# Patient Record
Sex: FEMALE | Race: WHITE | NOT HISPANIC OR LATINO
[De-identification: names, ages, dates, MRNs, and addresses within clinical notes are randomized per-mention and may not be internally consistent; named-entity substitution may affect disease eponyms.]

---

## 2021-04-23 PROBLEM — Z00.00 ENCOUNTER FOR PREVENTIVE HEALTH EXAMINATION: Status: ACTIVE | Noted: 2021-04-23

## 2021-04-29 ENCOUNTER — APPOINTMENT (OUTPATIENT)
Dept: NEUROLOGY | Facility: CLINIC | Age: 36
End: 2021-04-29

## 2021-07-20 ENCOUNTER — APPOINTMENT (OUTPATIENT)
Dept: NEUROLOGY | Facility: CLINIC | Age: 36
End: 2021-07-20
Payer: MEDICAID

## 2021-07-20 VITALS
BODY MASS INDEX: 37.41 KG/M2 | OXYGEN SATURATION: 97 % | DIASTOLIC BLOOD PRESSURE: 83 MMHG | SYSTOLIC BLOOD PRESSURE: 126 MMHG | WEIGHT: 230 LBS | HEART RATE: 87 BPM | HEIGHT: 65.75 IN | TEMPERATURE: 97.7 F

## 2021-07-20 PROCEDURE — 99205 OFFICE O/P NEW HI 60 MIN: CPT

## 2021-07-20 NOTE — HISTORY OF PRESENT ILLNESS
[FreeTextEntry1] : 37 yo F presents for epilepsy eval\par \par Patient reports she was diagnosed with cryptogenic focal epilepsy.\par 3 convulsive seizures in lifetime- 2003, 2004, 2005.\par Preceded by stressful situations\par Description- confused for 15 minutes (does not recall this period of time) before convulsive seizure w/ urinary incontinence \par She reports she took small dose of Finlepsin (CBZ in [place]) from 2003 to 2009. Neurologist in 2009 d/franco meds at patients request\par Since then no convulsive seizures reported.\par 2 times transient LOC- last time in  2012\par [place] (so [place]),  once occurred at home after drinking wine\par \par Following now with psychologist for anxiety/depression who advised for "fresh EEG."\par Now on Prozac 40mg/daily (for past 3 month- helping w/ depression), Ativan 1.5mg/day (just this week for sleep)\par Also reports severe migraines, sleep disturbances\par \par Previously had anxiety/depression at the time of seizures. Did not want to go out fear of having seizure\par \par No FHx of epilepsy, denies head trauma, meningitis \par \par Son autistic spectrum (does not sleep at night)\par +family stress\par Not driving

## 2021-07-20 NOTE — DISCUSSION/SUMMARY
[FreeTextEntry1] : 35 yo F presents for epilepsy eval\par \par plan:\par 48HR AEEG\par MRI brain epilepsy protocol\par Not driving\par F/u in 1-2 months

## 2021-07-20 NOTE — PHYSICAL EXAM
[General Appearance - Alert] : alert [General Appearance - In No Acute Distress] : in no acute distress [Oriented To Time, Place, And Person] : oriented to person, place, and time [Impaired Insight] : insight and judgment were intact [Person] : oriented to person [Place] : oriented to place [Time] : oriented to time [Fluency] : fluency intact [Cranial Nerves Optic (II)] : visual acuity intact bilaterally,  visual fields full to confrontation, pupils equal round and reactive to light [Cranial Nerves Oculomotor (III)] : extraocular motion intact [Cranial Nerves Trigeminal (V)] : facial sensation intact symmetrically [Cranial Nerves Facial (VII)] : face symmetrical [Cranial Nerves Vestibulocochlear (VIII)] : hearing was intact bilaterally [Cranial Nerves Glossopharyngeal (IX)] : tongue and palate midline [Cranial Nerves Accessory (XI - Cranial And Spinal)] : head turning and shoulder shrug symmetric [Cranial Nerves Hypoglossal (XII)] : there was no tongue deviation with protrusion [Motor Tone] : muscle tone was normal in all four extremities [Motor Strength] : muscle strength was normal in all four extremities [Sensation Tactile Decrease] : light touch was intact [Abnormal Walk] : normal gait [Balance] : balance was intact

## 2021-08-19 ENCOUNTER — OUTPATIENT (OUTPATIENT)
Dept: OUTPATIENT SERVICES | Facility: HOSPITAL | Age: 36
LOS: 1 days | End: 2021-08-19
Payer: MEDICAID

## 2021-08-19 ENCOUNTER — APPOINTMENT (OUTPATIENT)
Dept: MRI IMAGING | Facility: HOSPITAL | Age: 36
End: 2021-08-19
Payer: MEDICAID

## 2021-08-19 PROCEDURE — 70551 MRI BRAIN STEM W/O DYE: CPT

## 2021-08-19 PROCEDURE — 70551 MRI BRAIN STEM W/O DYE: CPT | Mod: 26

## 2021-08-19 PROCEDURE — 76377 3D RENDER W/INTRP POSTPROCES: CPT | Mod: 26

## 2021-08-19 PROCEDURE — 76377 3D RENDER W/INTRP POSTPROCES: CPT

## 2021-09-10 ENCOUNTER — APPOINTMENT (OUTPATIENT)
Dept: NEUROLOGY | Facility: CLINIC | Age: 36
End: 2021-09-10
Payer: MEDICAID

## 2021-09-10 VITALS
HEART RATE: 89 BPM | DIASTOLIC BLOOD PRESSURE: 78 MMHG | HEIGHT: 65.75 IN | OXYGEN SATURATION: 97 % | SYSTOLIC BLOOD PRESSURE: 112 MMHG | BODY MASS INDEX: 37.41 KG/M2 | WEIGHT: 230 LBS | TEMPERATURE: 97.3 F

## 2021-09-10 DIAGNOSIS — M54.2 CERVICALGIA: ICD-10-CM

## 2021-09-10 DIAGNOSIS — M54.9 DORSALGIA, UNSPECIFIED: ICD-10-CM

## 2021-09-10 PROCEDURE — 99215 OFFICE O/P EST HI 40 MIN: CPT

## 2021-09-15 ENCOUNTER — NON-APPOINTMENT (OUTPATIENT)
Age: 36
End: 2021-09-15

## 2021-09-15 ENCOUNTER — APPOINTMENT (OUTPATIENT)
Dept: NEUROLOGY | Facility: CLINIC | Age: 36
End: 2021-09-15
Payer: MEDICAID

## 2021-09-15 ENCOUNTER — APPOINTMENT (OUTPATIENT)
Dept: NEUROLOGY | Facility: CLINIC | Age: 36
End: 2021-09-15

## 2021-09-15 PROCEDURE — 95816 EEG AWAKE AND DROWSY: CPT

## 2021-09-16 ENCOUNTER — NON-APPOINTMENT (OUTPATIENT)
Age: 36
End: 2021-09-16

## 2021-09-17 ENCOUNTER — APPOINTMENT (OUTPATIENT)
Dept: NEUROLOGY | Facility: CLINIC | Age: 36
End: 2021-09-17

## 2021-11-02 ENCOUNTER — APPOINTMENT (OUTPATIENT)
Dept: NEUROLOGY | Facility: CLINIC | Age: 36
End: 2021-11-02
Payer: MEDICAID

## 2021-11-02 VITALS
BODY MASS INDEX: 36.27 KG/M2 | DIASTOLIC BLOOD PRESSURE: 84 MMHG | WEIGHT: 223 LBS | HEART RATE: 91 BPM | HEIGHT: 65.75 IN | SYSTOLIC BLOOD PRESSURE: 118 MMHG | OXYGEN SATURATION: 97 % | TEMPERATURE: 97.1 F

## 2021-11-02 PROCEDURE — 99214 OFFICE O/P EST MOD 30 MIN: CPT

## 2021-11-02 NOTE — HISTORY OF PRESENT ILLNESS
[FreeTextEntry1] : Interim Hx: 11/2/2021\par \par Patient reports that after starting TPM  initially was experiencing loss of appetite and headaches. 3 weeks later headaches went away and appetite came back.\par Based on chart review has lost 7Ibs since last visit. Patient believes she has regained some weight since appetite has returned.\par In the last week had a headache that lasted 5 days continuously and did not respond to Naprosyn.\par \par No seizures.\par Routine EEG normal\par EMU admission not scheduled yet\par ___________________\par Interim Hx: 9/10/2021\par \par Patient report increasing headaches, has been having daily headaches.\par States over past 5 years headache frequency has been increasing. Has been taking ibuprofen often\par Also reports she pulled neck/back lifting an item and has been having pain\par \par No seizures reported.\par EEG not completed yet, would like to do inpatient because her soon will pull of the leads\par \par MRI brain 8/19/2021- white matter changes \par _________________\par Initial Hx: 7/20/2021\par 35 yo F presents for epilepsy eval\par \par Patient reports she was diagnosed with cryptogenic focal epilepsy.\par 3 convulsive seizures in lifetime- 2003, 2004, 2005.\par Preceded by stressful situations\par Description- confused for 15 minutes (does not recall this period of time) before convulsive seizure w/ urinary incontinence \par She reports she took small dose of Finlepsin (CBZ in [place]) from 2003 to 2009. Neurologist in 2009 d/franco meds at patients request\par Since then no convulsive seizures reported.\par 2 times transient LOC- last time in  2012\par [place] subway (so [place]),  once occurred at home after drinking wine\par \par Following now with psychologist for anxiety/depression who advised for "fresh EEG."\par Now on Prozac 40mg/daily (for past 3 month- helping w/ depression), Ativan 1.5mg/day (just this week for sleep)\par Also reports severe migraines, sleep disturbances\par \par Previously had anxiety/depression at the time of seizures. Did not want to go out fear of having seizure\par \par No FHx of epilepsy, denies head trauma, meningitis \par \par Son autistic spectrum (does not sleep at night)\par +family stress\par Not driving

## 2021-11-02 NOTE — DISCUSSION/SUMMARY
Order pended for Dr Mathur to sign for WBC, C&S, O&P, C. Diff. Then will send to [facility].     Annette notified and they are bringing the stool sample up to [facility].        [FreeTextEntry1] : 37 yo F w/ migraines, depression presents for epilepsy eval\par \par plan:\par #focal epilepsy\par 48HR EMU admission (does not want to do Ambulatory study)\par \par \par #migraine/headache\par Increase TPM to 75mg/daily - monitor for more weight loss\par Naprosyn 500mg BID PRN- advised to take at onset of headache\par \par Physical therapy for neck/back pain previously recommended \par \par F/u in 3 months

## 2021-11-03 LAB
ALBUMIN SERPL ELPH-MCNC: 4.6 G/DL
ALP BLD-CCNC: 91 U/L
ALT SERPL-CCNC: 17 U/L
ANION GAP SERPL CALC-SCNC: 15 MMOL/L
AST SERPL-CCNC: 16 U/L
BASOPHILS # BLD AUTO: 0.05 K/UL
BASOPHILS NFR BLD AUTO: 0.6 %
BILIRUB SERPL-MCNC: 0.3 MG/DL
BUN SERPL-MCNC: 16 MG/DL
CALCIUM SERPL-MCNC: 9 MG/DL
CHLORIDE SERPL-SCNC: 105 MMOL/L
CO2 SERPL-SCNC: 18 MMOL/L
CREAT SERPL-MCNC: 0.57 MG/DL
EOSINOPHIL # BLD AUTO: 0.14 K/UL
EOSINOPHIL NFR BLD AUTO: 1.8 %
GLUCOSE SERPL-MCNC: 88 MG/DL
HCT VFR BLD CALC: 40.1 %
HGB BLD-MCNC: 12.6 G/DL
IMM GRANULOCYTES NFR BLD AUTO: 0.1 %
LYMPHOCYTES # BLD AUTO: 2.61 K/UL
LYMPHOCYTES NFR BLD AUTO: 33.6 %
MAN DIFF?: NORMAL
MCHC RBC-ENTMCNC: 28.3 PG
MCHC RBC-ENTMCNC: 31.4 GM/DL
MCV RBC AUTO: 90.1 FL
MONOCYTES # BLD AUTO: 0.34 K/UL
MONOCYTES NFR BLD AUTO: 4.4 %
NEUTROPHILS # BLD AUTO: 4.62 K/UL
NEUTROPHILS NFR BLD AUTO: 59.5 %
PLATELET # BLD AUTO: 428 K/UL
POTASSIUM SERPL-SCNC: 4.4 MMOL/L
PROT SERPL-MCNC: 7.2 G/DL
RBC # BLD: 4.45 M/UL
RBC # FLD: 14.1 %
SODIUM SERPL-SCNC: 137 MMOL/L
WBC # FLD AUTO: 7.77 K/UL

## 2021-11-24 LAB — TOPIRAMATE SERPL-MCNC: 1.4 MCG/ML

## 2022-02-01 ENCOUNTER — APPOINTMENT (OUTPATIENT)
Dept: NEUROLOGY | Facility: CLINIC | Age: 37
End: 2022-02-01
Payer: MEDICAID

## 2022-02-01 VITALS
WEIGHT: 228 LBS | DIASTOLIC BLOOD PRESSURE: 78 MMHG | TEMPERATURE: 97.2 F | HEIGHT: 65.75 IN | SYSTOLIC BLOOD PRESSURE: 113 MMHG | HEART RATE: 91 BPM | OXYGEN SATURATION: 99 % | BODY MASS INDEX: 37.08 KG/M2

## 2022-02-01 PROCEDURE — 99214 OFFICE O/P EST MOD 30 MIN: CPT

## 2022-02-23 ENCOUNTER — RX RENEWAL (OUTPATIENT)
Age: 37
End: 2022-02-23

## 2022-03-22 ENCOUNTER — APPOINTMENT (OUTPATIENT)
Dept: NEUROLOGY | Facility: CLINIC | Age: 37
End: 2022-03-22
Payer: MEDICAID

## 2022-03-23 PROCEDURE — 95719 EEG PHYS/QHP EA INCR W/O VID: CPT

## 2022-03-23 PROCEDURE — 95708 EEG WO VID EA 12-26HR UNMNTR: CPT

## 2022-03-23 PROCEDURE — 95700 EEG CONT REC W/VID EEG TECH: CPT

## 2022-03-24 ENCOUNTER — APPOINTMENT (OUTPATIENT)
Dept: NEUROLOGY | Facility: CLINIC | Age: 37
End: 2022-03-24

## 2022-06-01 ENCOUNTER — APPOINTMENT (OUTPATIENT)
Dept: NEUROLOGY | Facility: CLINIC | Age: 37
End: 2022-06-01
Payer: MEDICAID

## 2022-06-01 VITALS
HEIGHT: 65 IN | SYSTOLIC BLOOD PRESSURE: 117 MMHG | TEMPERATURE: 96.2 F | DIASTOLIC BLOOD PRESSURE: 73 MMHG | OXYGEN SATURATION: 97 % | HEART RATE: 93 BPM

## 2022-06-01 PROCEDURE — 99214 OFFICE O/P EST MOD 30 MIN: CPT

## 2022-06-01 RX ORDER — TOPIRAMATE 25 MG/1
25 TABLET, FILM COATED ORAL
Qty: 90 | Refills: 3 | Status: DISCONTINUED | COMMUNITY
Start: 2022-02-23 | End: 2022-06-01

## 2022-06-01 RX ORDER — FLUOXETINE HYDROCHLORIDE 40 MG/1
40 CAPSULE ORAL
Refills: 0 | Status: DISCONTINUED | COMMUNITY
End: 2022-06-01

## 2022-06-01 RX ORDER — ERGOCALCIFEROL (VITAMIN D2) 1250 MCG
50000 CAPSULE ORAL
Refills: 0 | Status: DISCONTINUED | COMMUNITY
End: 2022-06-01

## 2022-06-01 RX ORDER — LORAZEPAM 1 MG/1
1 TABLET ORAL
Qty: 5 | Refills: 0 | Status: DISCONTINUED | COMMUNITY
End: 2022-06-01

## 2022-10-03 ENCOUNTER — APPOINTMENT (OUTPATIENT)
Dept: NEUROLOGY | Facility: CLINIC | Age: 37
End: 2022-10-03

## 2022-10-03 VITALS
SYSTOLIC BLOOD PRESSURE: 108 MMHG | BODY MASS INDEX: 38.93 KG/M2 | HEIGHT: 65 IN | DIASTOLIC BLOOD PRESSURE: 75 MMHG | HEART RATE: 81 BPM | OXYGEN SATURATION: 96 % | WEIGHT: 233.69 LBS | TEMPERATURE: 96.8 F

## 2022-10-03 PROCEDURE — 99213 OFFICE O/P EST LOW 20 MIN: CPT

## 2023-01-04 ENCOUNTER — RX RENEWAL (OUTPATIENT)
Age: 38
End: 2023-01-04

## 2023-03-17 ENCOUNTER — RX RENEWAL (OUTPATIENT)
Age: 38
End: 2023-03-17

## 2023-04-11 ENCOUNTER — RX RENEWAL (OUTPATIENT)
Age: 38
End: 2023-04-11

## 2023-04-18 ENCOUNTER — RX RENEWAL (OUTPATIENT)
Age: 38
End: 2023-04-18

## 2023-05-19 ENCOUNTER — NON-APPOINTMENT (OUTPATIENT)
Age: 38
End: 2023-05-19

## 2023-05-25 ENCOUNTER — NON-APPOINTMENT (OUTPATIENT)
Age: 38
End: 2023-05-25

## 2023-05-25 ENCOUNTER — APPOINTMENT (OUTPATIENT)
Dept: NEUROLOGY | Facility: CLINIC | Age: 38
End: 2023-05-25
Payer: MEDICAID

## 2023-05-25 PROCEDURE — 99213 OFFICE O/P EST LOW 20 MIN: CPT | Mod: 95

## 2023-05-26 NOTE — DATA REVIEWED
[de-identified] : EXAM:  MR BRAIN SEIZURE EPILEPSY 3D#\par \par PROCEDURE DATE:  08/19/2021\par \par \par \par INTERPRETATION:  PROCEDURE: MRI Brain without contrast\par \par INDICATION: [Focal epilepsy]\par \par TECHNIQUE: Sagittal T1, axial T2, FLAIR, diffusion, gradient echo, and coronal T2, FLAIR hippocampal images of the brain were obtained.\par \par COMPARISON: None\par \par FINDINGS:\par \par The ventricles and sulci are normal in size and configuration.\par \par There is no mass, mass effect, midline shift or extra-axial collection. There is no acute intracranial hemorrhage.\par \par There are few scattered punctate foci of signal hyperintensity within the subcortical and periventricular white matter on FLAIR sequence that are nonspecific in this age demographic and may reflect sequela of minimal chronic microvascular ischemia versus gliosis of other etiologies. There is otherwise no abnormal signal identified within the brain parenchyma. The diffusion-weighted images demonstrate no recent infarction.\par \par \par \par Hippocampi are symmetric in signal and morphology without asymmetric atrophy of the fornices and mamillary bodies identified to suggest mesial temporal sclerosis.\par \par \par There is no susceptibility related signal loss to suggest hemosiderin deposition or calcification.\par \par The vascular flow voids are present.\par \par The sella and suprasellar regions are unremarkable.\par \par The visualized paranasal sinuses are well-aerated. The mastoid air cells are well-aerated.\par \par \par IMPRESSION:\par \par No acute intracranial hemorrhage, acute infarction, extra-axial fluid collection or hydrocephalus.\par \par No MR evidence for mesial temporal sclerosis.\par \par --- End of Report ---\par \par \par \par \par Thank you for the opportunity to participate in the care of this patient.\par \par CAM ARTIS MD; Resident Radiologist\par This document has been electronically signed.\par THOMAS REYNOSO MD; Attending Radiologist\par This document has been electronically signed. Aug 24 2021  3:21PM\par \par  \par

## 2023-05-26 NOTE — HISTORY OF PRESENT ILLNESS
[FreeTextEntry1] : Interim Hx: 5/26/2023\par \par Headaches continue to be better, now occurs once every few weeks\par She is unsure if the naprosyn helps anymore\par \par Still has less energy, routine has changed taking care of son more\par ____________________\par Interim Hx: 10/3/2022\par \par Headache frequency now 2-3x/month\par TPM 150mg/daily\par Naprosyn as abortive\par No major side effects \par Does report she has recently gained weight and feels less energized.\par Had blood work with PMD 9/30- only CBC so far resulted \par _________________\par Interim Hx: 6/1/2022\par \par Headache frequency 5x/month\par TPM 100mg/daily\par Naprosyn as abortive\par No side effects \par \par Ambulatory EEG 3/22-3/23/2022- left temporal slowing\par Patient reports she was previously told epilepsy effected left side.\par ___________________\par Interim Hx: 2/1/2022\par \par Headaches now 3-4x/month\par Covid- Last month (no change in headaches)\par Continues TPM 75mg/daily\par No side effects, previous weight loss regained.\par \par No seizures\par EMU admission not scheduled  yet\par Would like to do this month- will be undergoing divorce in upcoming months\par ____________________\par Interim Hx: 11/2/2021\par \par Patient reports that after starting TPM  initially was experiencing loss of appetite and headaches. 3 weeks later headaches went away and appetite came back.\par Based on chart review has lost 7Ibs since last visit. Patient believes she has regained some weight since appetite has returned.\par In the last week had a headache that lasted 5 days continuously and did not respond to Naprosyn.\par \par No seizures.\par Routine EEG normal\par EMU admission not scheduled yet\par ___________________\par Interim Hx: 9/10/2021\par \par Patient report increasing headaches, has been having daily headaches.\par States over past 5 years headache frequency has been increasing. Has been taking ibuprofen often\par Also reports she pulled neck/back lifting an item and has been having pain\par \par No seizures reported.\par EEG not completed yet, would like to do inpatient because her soon will pull of the leads\par \par MRI brain 8/19/2021- white matter changes \par _________________\par Initial Hx: 7/20/2021\par 35 yo F presents for epilepsy eval\par \par Patient reports she was diagnosed with cryptogenic focal epilepsy.\par 3 convulsive seizures in lifetime- 2003, 2004, 2005.\par Preceded by stressful situations\par Description- confused for 15 minutes (does not recall this period of time) before convulsive seizure w/ urinary incontinence \par She reports she took small dose of Finlepsin (CBZ in [place]) from 2003 to 2009. Neurologist in 2009 d/franco meds at patients request\par Since then no convulsive seizures reported.\par 2 times transient LOC- last time in  2012\par Once [place] subway (so [place] fall),  once occurred at home after drinking wine\par \par Following now with psychologist for anxiety/depression who advised for "fresh EEG."\par Now on Prozac 40mg/daily (for past 3 month- helping w/ depression), Ativan 1.5mg/day (just this week for sleep)\par Also reports severe migraines, sleep disturbances\par \par Previously had anxiety/depression at the time of seizures. Did not want to go out fear of having seizure\par \par No FHx of epilepsy, denies head trauma, meningitis \par \par Son autistic spectrum (does not sleep at night)\par +family stress\par Not driving

## 2023-05-26 NOTE — DISCUSSION/SUMMARY
[FreeTextEntry1] : 39 yo F w/ migraines, depression presents for hx of epilepsy and migraines\par MRI brain 8/19/2021- scattered white matter changes\par \par plan:\par #Hx of focal epilepsy\par Ambulatory EEG 3/22-3/23/2022- left temporal slowing\par No indication to start AEDs at this time\par \par #migraine/headache\par Reduce TPM to 100mg/daily to  see if patients tired feeling improves\par Trial of Diclofenac PRN\par F/u in 4 months

## 2023-05-26 NOTE — REASON FOR VISIT
[Home] : at home, [unfilled] , at the time of the visit. [Medical Office: ([facility])___] : at the medical office located in  [Patient] : the patient [Follow-Up: _____] : a [unfilled] follow-up visit [[employer] ] : provided by [employer]   [TWNoteComboBox1] : [place]

## 2023-06-08 ENCOUNTER — TRANSCRIPTION ENCOUNTER (OUTPATIENT)
Age: 38
End: 2023-06-08

## 2023-07-21 ENCOUNTER — RX RENEWAL (OUTPATIENT)
Age: 38
End: 2023-07-21

## 2023-09-11 ENCOUNTER — RX RENEWAL (OUTPATIENT)
Age: 38
End: 2023-09-11

## 2023-11-20 ENCOUNTER — RX RENEWAL (OUTPATIENT)
Age: 38
End: 2023-11-20

## 2024-01-10 ENCOUNTER — APPOINTMENT (OUTPATIENT)
Dept: NEUROLOGY | Facility: CLINIC | Age: 39
End: 2024-01-10
Payer: MEDICAID

## 2024-01-10 VITALS
HEART RATE: 87 BPM | TEMPERATURE: 96.5 F | DIASTOLIC BLOOD PRESSURE: 81 MMHG | WEIGHT: 237 LBS | OXYGEN SATURATION: 96 % | HEIGHT: 66 IN | BODY MASS INDEX: 38.09 KG/M2 | SYSTOLIC BLOOD PRESSURE: 120 MMHG

## 2024-01-10 DIAGNOSIS — G40.109 LOCALIZATION-RELATED (FOCAL) (PARTIAL) SYMPTOMATIC EPILEPSY AND EPILEPTIC SYNDROMES WITH SIMPLE PARTIAL SEIZURES, NOT INTRACTABLE, W/OUT STATUS EPILEPTICUS: ICD-10-CM

## 2024-01-10 PROCEDURE — 99214 OFFICE O/P EST MOD 30 MIN: CPT

## 2024-01-10 NOTE — HISTORY OF PRESENT ILLNESS
[FreeTextEntry1] : Interim Hx: 1/10/2024  Headaches increased over the summer. Now occurring near daily. Started after reducing TPM to 100mg. No improvement in fatigue. Does not recall if diclofenac helped. Also mentions headache worse upon awakening. +apneic episodes, snoring, daytime fatigue.  Also had an episode where she passed out. Went to [facility] Had viral illness, unsure if she had a fever. Was laying in bed and had brief LOC, described as brief black vision and seconds of LOC (reported by ). No convulsions. _____________________ Interim Hx: 5/26/2023  Headaches continue to be better, now occurs once every few weeks She is unsure if the naprosyn helps anymore  Still has less energy, routine has changed taking care of son more ____________________ Interim Hx: 10/3/2022  Headache frequency now 2-3x/month TPM 150mg/daily Naprosyn as abortive No major side effects  Does report she has recently gained weight and feels less energized. Had blood work with PMD 9/30- only CBC so far resulted  _________________ Interim Hx: 6/1/2022  Headache frequency 5x/month TPM 100mg/daily Naprosyn as abortive No side effects   Ambulatory EEG 3/22-3/23/2022- left temporal slowing Patient reports she was previously told epilepsy effected left side. ___________________ Interim Hx: 2/1/2022  Headaches now 3-4x/month Covid- Last month (no change in headaches) Continues TPM 75mg/daily No side effects, previous weight loss regained.  No seizures EMU admission not scheduled  yet Would like to do this month- will be undergoing divorce in upcoming months ____________________ Interim Hx: 11/2/2021  Patient reports that after starting TPM  initially was experiencing loss of appetite and headaches. 3 weeks later headaches went away and appetite came back. Based on chart review has lost 7Ibs since last visit. Patient believes she has regained some weight since appetite has returned. In the last week had a headache that lasted 5 days continuously and did not respond to Naprosyn.  No seizures. Routine EEG normal EMU admission not scheduled yet ___________________ Interim Hx: 9/10/2021  Patient report increasing headaches, has been having daily headaches. States over past 5 years headache frequency has been increasing. Has been taking ibuprofen often Also reports she pulled neck/back lifting an item and has been having pain  No seizures reported. EEG not completed yet, would like to do inpatient because her soon will pull of the leads  MRI brain 8/19/2021- white matter changes  _________________ Initial Hx: 7/20/2021 37 yo F presents for epilepsy eval  Patient reports she was diagnosed with cryptogenic focal epilepsy. 3 convulsive seizures in lifetime- 2003, 2004, 2005. Preceded by stressful situations Description- confused for 15 minutes (does not recall this period of time) before convulsive seizure w/ urinary incontinence  She reports she took small dose of Finlepsin (CBZ in [place]) from 2003 to 2009. Neurologist in 2009 d/franco meds at patients request Since then no convulsive seizures reported. 2 times transient LOC- last time in  2012 [place] subway (so no fall),  once occurred at home after drinking wine  Following now with psychologist for anxiety/depression who advised for "fresh EEG." Now on Prozac 40mg/daily (for past 3 month- helping w/ depression), Ativan 1.5mg/day (just this week for sleep) Also reports severe migraines, sleep disturbances  Previously had anxiety/depression at the time of seizures. Did not want to go out fear of having seizure  No FHx of epilepsy, denies head trauma, meningitis   Son autistic spectrum (does not sleep at night) +family stress Not driving

## 2024-01-10 NOTE — DISCUSSION/SUMMARY
[FreeTextEntry1] : 37 yo F w/ migraines, depression presents for hx of epilepsy and migraines MRI brain 8/19/2021- scattered white matter changes  plan: #Hx of focal epilepsy Ambulatory EEG 3/22-3/23/2022- left temporal slowing Recent episode of transient LOC likely syncope but will repeat aEEG  #migraine/headache Sleep referral and home sleep study  given apneic episodes described in sleep Increase TPM back to 150mg/daily Diclofenac PRN  F/u in 3-4 months

## 2024-01-10 NOTE — REASON FOR VISIT
[Follow-Up: _____] : a [unfilled] follow-up visit [[employer] ] : provided by [employer]   [TWNoteComboBox1] : Ghanaian

## 2024-01-10 NOTE — DATA REVIEWED
[de-identified] : EXAM:  MR BRAIN SEIZURE EPILEPSY 3D#\par  \par  PROCEDURE DATE:  08/19/2021\par  \par  \par  \par  INTERPRETATION:  PROCEDURE: MRI Brain without contrast\par  \par  INDICATION: [Focal epilepsy]\par  \par  TECHNIQUE: Sagittal T1, axial T2, FLAIR, diffusion, gradient echo, and coronal T2, FLAIR hippocampal images of the brain were obtained.\par  \par  COMPARISON: None\par  \par  FINDINGS:\par  \par  The ventricles and sulci are normal in size and configuration.\par  \par  There is no mass, mass effect, midline shift or extra-axial collection. There is no acute intracranial hemorrhage.\par  \par  There are few scattered punctate foci of signal hyperintensity within the subcortical and periventricular white matter on FLAIR sequence that are nonspecific in this age demographic and may reflect sequela of minimal chronic microvascular ischemia versus gliosis of other etiologies. There is otherwise no abnormal signal identified within the brain parenchyma. The diffusion-weighted images demonstrate no recent infarction.\par  \par  \par  \par  Hippocampi are symmetric in signal and morphology without asymmetric atrophy of the fornices and mamillary bodies identified to suggest mesial temporal sclerosis.\par  \par  \par  There is no susceptibility related signal loss to suggest hemosiderin deposition or calcification.\par  \par  The vascular flow voids are present.\par  \par  The sella and suprasellar regions are unremarkable.\par  \par  The visualized paranasal sinuses are well-aerated. The mastoid air cells are well-aerated.\par  \par  \par  IMPRESSION:\par  \par  No acute intracranial hemorrhage, acute infarction, extra-axial fluid collection or hydrocephalus.\par  \par  No MR evidence for mesial temporal sclerosis.\par  \par  --- End of Report ---\par  \par  \par  \par  \par  Thank you for the opportunity to participate in the care of this patient.\par  \par  CAM ARTIS MD; Resident Radiologist\par  This document has been electronically signed.\par  THOMAS REYNOSO MD; Attending Radiologist\par  This document has been electronically signed. Aug 24 2021  3:21PM\par  \par   \par

## 2024-02-02 ENCOUNTER — APPOINTMENT (OUTPATIENT)
Dept: NEUROLOGY | Facility: CLINIC | Age: 39
End: 2024-02-02
Payer: COMMERCIAL

## 2024-02-02 PROCEDURE — 95816 EEG AWAKE AND DROWSY: CPT

## 2024-02-03 PROCEDURE — 95708 EEG WO VID EA 12-26HR UNMNTR: CPT

## 2024-02-04 PROCEDURE — 95700 EEG CONT REC W/VID EEG TECH: CPT

## 2024-02-04 PROCEDURE — 95721 EEG PHY/QHP>36<60 HR W/O VID: CPT

## 2024-02-04 PROCEDURE — 95708 EEG WO VID EA 12-26HR UNMNTR: CPT

## 2024-02-05 ENCOUNTER — APPOINTMENT (OUTPATIENT)
Dept: DERMATOLOGY | Facility: CLINIC | Age: 39
End: 2024-02-05
Payer: COMMERCIAL

## 2024-02-05 ENCOUNTER — APPOINTMENT (OUTPATIENT)
Dept: NEUROLOGY | Facility: CLINIC | Age: 39
End: 2024-02-05

## 2024-02-05 VITALS — WEIGHT: 230 LBS | BODY MASS INDEX: 36.96 KG/M2 | HEIGHT: 66 IN

## 2024-02-05 DIAGNOSIS — L81.0 POSTINFLAMMATORY HYPERPIGMENTATION: ICD-10-CM

## 2024-02-05 DIAGNOSIS — T14.8XXA OTHER INJURY OF UNSPECIFIED BODY REGION, INITIAL ENCOUNTER: ICD-10-CM

## 2024-02-05 DIAGNOSIS — L73.9 FOLLICULAR DISORDER, UNSPECIFIED: ICD-10-CM

## 2024-02-05 DIAGNOSIS — L73.0 ACNE KELOID: ICD-10-CM

## 2024-02-05 PROCEDURE — 99204 OFFICE O/P NEW MOD 45 MIN: CPT

## 2024-02-05 RX ORDER — BENZOYL PEROXIDE 100 MG/G
10 EMULSION TOPICAL DAILY
Qty: 1 | Refills: 4 | Status: ACTIVE | COMMUNITY
Start: 2024-02-05 | End: 1900-01-01

## 2024-02-05 RX ORDER — NAPROXEN 500 MG/1
500 TABLET ORAL
Qty: 60 | Refills: 2 | Status: COMPLETED | COMMUNITY
Start: 2021-09-10 | End: 2024-02-05

## 2024-02-05 NOTE — ASSESSMENT
[FreeTextEntry1] : #Acne on face, #Folliculitis on body- Chronic, flaring with significant #acne scarring on back, #PIH on thighs and #excoriations  - Sig component of scratching likely 2/2 some underlying anxiety, c/w bupropion as well as continued psych care  - Stop curology- given so many active ingredients in one formulation, likely less effective - Start BPO wash (5% or less to face, 10% to body daily) - Clinda lotion to AA bid, SED - Reports flares with menses but did not think antonio 100mg helped. May consider re-trial at higher dose  RTC 3 months

## 2024-02-05 NOTE — HISTORY OF PRESENT ILLNESS
[FreeTextEntry1] : NP acne [de-identified] : BRUCE DE JESUS is a 39 year old F who presents for evaluation of the following  1. Acne- on face, back, upper lateral thighs Present for about 5 years since childbirth  Using curology formula (with numerous ingredients- including AA, niacinamide, tretinoin, clindamycin, spironolactone, etc) all in one formulation  Feels like face flares with menses Was previously on antonio 100mg for about a year wtihout much noticeable improvement  Notes she has some anxiety that leads to her picking the lesions- notes improvement since starting bupropion   SH: [place]ij[place]

## 2024-02-05 NOTE — PHYSICAL EXAM
[FreeTextEntry3] : Focused skin exam performed  The relevant portions of the exam were performed today  AAOx3, NAD, well-appearing / pleasant Focused examination within normal limits with the exception of:  Few acneiform papules over forehead>>cheeks Numerous round skin colored atrophic scars over back, few active pink folliculo-centric papules B/l lateral thighs with hyperpigmented and pink papules, several of which are excoriated

## 2024-02-16 ENCOUNTER — APPOINTMENT (OUTPATIENT)
Dept: ORTHOPEDIC SURGERY | Facility: CLINIC | Age: 39
End: 2024-02-16
Payer: COMMERCIAL

## 2024-02-16 VITALS
WEIGHT: 230 LBS | TEMPERATURE: 98.2 F | HEART RATE: 82 BPM | DIASTOLIC BLOOD PRESSURE: 80 MMHG | HEIGHT: 66 IN | SYSTOLIC BLOOD PRESSURE: 119 MMHG | BODY MASS INDEX: 36.96 KG/M2

## 2024-02-16 DIAGNOSIS — M54.50 LOW BACK PAIN, UNSPECIFIED: ICD-10-CM

## 2024-02-16 PROCEDURE — 72100 X-RAY EXAM L-S SPINE 2/3 VWS: CPT

## 2024-02-16 PROCEDURE — 99205 OFFICE O/P NEW HI 60 MIN: CPT | Mod: 25

## 2024-02-16 RX ORDER — CYCLOBENZAPRINE HYDROCHLORIDE 5 MG/1
5 TABLET, FILM COATED ORAL 3 TIMES DAILY
Qty: 30 | Refills: 2 | Status: ACTIVE | COMMUNITY
Start: 2024-02-16 | End: 1900-01-01

## 2024-02-16 NOTE — DISCUSSION/SUMMARY
[de-identified] : I reviewed the natural history of this condition as well as all treatment options.  I have prescribed a muscle relaxant for symptomatic relief should another episode occur.  I also recommended a course of exercise based physical therapy and provided a referral and some recommended locations for this.  I requested that she keep us informed of her progress and we may consider additional imaging in the future should symptoms change or progress however have not recommended any presently.  All questions were answered.  I have spent greater than 60 minutes preparing to see the patient, collecting relevant history, performing a thorough history and physical examination, counseling the patient regarding my findings ordering the appropriate therapies and tests, communicating with other relevant healthcare professionals, documenting my encounter and coordinating care.

## 2024-02-16 NOTE — HISTORY OF PRESENT ILLNESS
[de-identified] : 39-year-old female presents as new patient evaluation of intermittent left-sided low back pain.  States this initially began last summer, frequently carrying her 5-year-old son.  She describes an aching soreness relatively acute in the left-sided low back and eventually improved after several weeks taking muscle relaxant relative rest.  At that time denies any radiating pain numbness tingling weakness in extremities.  The symptoms occurred several weeks ago due to lifting and bending forward.  She again describes some left-sided low back pain that is crampy achy but has improved over the last several days.  Denies any radiating pain numbness tingling weakness in the extremities presently.

## 2024-02-16 NOTE — PHYSICAL EXAM
[UE/LE] : Sensory: Intact in bilateral upper & lower extremities [Knee] : patellar 2+ and symmetric bilaterally [Ankle] : ankle 2+ and symmetric bilaterally [Normal Touch] : sensation intact for touch [Normal Proprioception] : sensation intact for proprioception [Normal] : No costovertebral angle tenderness and no spinal tenderness [de-identified] : AP lateral lumbar spine 2/16/2024 Ortho PACS: Excellent maintenance of coronal and sagittal alignment.  Disc base heights are maintained.  No evidence of degenerative change or listhesis

## 2024-03-01 ENCOUNTER — APPOINTMENT (OUTPATIENT)
Dept: PULMONOLOGY | Facility: CLINIC | Age: 39
End: 2024-03-01
Payer: COMMERCIAL

## 2024-03-01 VITALS
DIASTOLIC BLOOD PRESSURE: 68 MMHG | WEIGHT: 235 LBS | OXYGEN SATURATION: 97 % | BODY MASS INDEX: 37.77 KG/M2 | SYSTOLIC BLOOD PRESSURE: 125 MMHG | HEART RATE: 80 BPM | TEMPERATURE: 97.9 F | HEIGHT: 66 IN

## 2024-03-01 PROCEDURE — 99204 OFFICE O/P NEW MOD 45 MIN: CPT

## 2024-03-01 NOTE — ASSESSMENT
[FreeTextEntry1] : 39F with hx of epilepsy, migraines, BMI 38 presenting for AM migraines and sleep apnea/evaluation.  Daily AM migraines, weight gain in the past year, with variable sleep shedule, "nightmares" of not being able to breathe all possible sx of sleep apnea. Due to hx of epilepsy would prefer in lab PSG.   Based on history and physical exam, sleep disordered breathing is  likely.  Prefer overnight polysomnography to evaluate EEG w hx epilepsy. The patient was advised to have overnight polysomnography, and will be seen in follow up after testing.   -continue follow up w/ neurology

## 2024-03-01 NOTE — HISTORY OF PRESENT ILLNESS
[Current] : current [TextBox_4] : 39F with hx of epilepsy, migraines, BMI 38 presenting for AM migraines and sleep apnea/evaluation.   Visit conducted via [place]  [employer] #[id number]. Referred by neurologist Dr. Koo for migraines and poor sleep. Since her son was born sleep schedule has been variable. Difficult sleep for first 2.5 years and at 3.4yo he was dx w/ autism. Once a week he will wake up at 3am and wakes her up and not able to fall back asleep as he is awake. She moved to the  6 years ago, her work is remote and still works in [place], so her work hours are on [place] time zones. She usually has working hours of 7am to 2-3pm but also can vary with events in the middle of the night.  In the past has suspected sleep apnea due to occasional headaches but now has migraines every morning when wakes up. Her   noted new snoring in within the past 6 months. Last summer had an episode when was falling asleep felt as if she couldn't breathe and had darkness in front of her eyes for which she presented to the ED. Often has nightmares where she feels like not able to breathe but thinks these are actually happening and have been occurring for past 15 years. No sleepwalking. For past year, has gained "a lot" of weight, she thinks 15lbs. No sinus congestion. Smokes e-cigarettes.  Fam hx: no family hx of sleep problems.

## 2024-03-01 NOTE — PHYSICAL EXAM
[No Acute Distress] : no acute distress [III] : Mallampati Class: III [Enlarged Base of the Tongue] : enlarged base of the tongue [Normal Appearance] : normal appearance [No Neck Mass] : no neck mass [Normal Rate/Rhythm] : normal rate/rhythm [No Murmurs] : no murmurs [Normal S1, S2] : normal s1, s2 [No Resp Distress] : no resp distress [Clear to Auscultation Bilaterally] : clear to auscultation bilaterally [No Clubbing] : no clubbing [No Cyanosis] : no cyanosis [FROM] : FROM [No Edema] : no edema [Oriented x3] : oriented x3 [Normal Affect] : normal affect [TextBox_2] : BMI 38

## 2024-03-19 ENCOUNTER — APPOINTMENT (OUTPATIENT)
Dept: SLEEP CENTER | Facility: HOSPITAL | Age: 39
End: 2024-03-19

## 2024-03-19 ENCOUNTER — OUTPATIENT (OUTPATIENT)
Dept: OUTPATIENT SERVICES | Facility: HOSPITAL | Age: 39
LOS: 1 days | End: 2024-03-19
Payer: COMMERCIAL

## 2024-03-19 DIAGNOSIS — G47.33 OBSTRUCTIVE SLEEP APNEA (ADULT) (PEDIATRIC): ICD-10-CM

## 2024-03-19 PROCEDURE — 95810 POLYSOM 6/> YRS 4/> PARAM: CPT

## 2024-03-19 PROCEDURE — 95810 POLYSOM 6/> YRS 4/> PARAM: CPT | Mod: 26

## 2024-03-21 ENCOUNTER — APPOINTMENT (OUTPATIENT)
Dept: PULMONOLOGY | Facility: CLINIC | Age: 39
End: 2024-03-21

## 2024-03-27 ENCOUNTER — RESULT CHARGE (OUTPATIENT)
Age: 39
End: 2024-03-27

## 2024-03-27 ENCOUNTER — APPOINTMENT (OUTPATIENT)
Dept: ENDOCRINOLOGY | Facility: CLINIC | Age: 39
End: 2024-03-27
Payer: COMMERCIAL

## 2024-03-27 VITALS
HEART RATE: 85 BPM | DIASTOLIC BLOOD PRESSURE: 82 MMHG | WEIGHT: 237 LBS | BODY MASS INDEX: 38.25 KG/M2 | SYSTOLIC BLOOD PRESSURE: 135 MMHG

## 2024-03-27 LAB — GLUCOSE BLDC GLUCOMTR-MCNC: 131

## 2024-03-27 PROCEDURE — 99205 OFFICE O/P NEW HI 60 MIN: CPT | Mod: GC,25

## 2024-03-27 RX ORDER — TOPIRAMATE 50 MG/1
50 TABLET, FILM COATED ORAL
Refills: 0 | Status: ACTIVE | COMMUNITY

## 2024-03-27 RX ORDER — CALCIUM POLYCARBOPHIL 625 MG
625 TABLET ORAL
Refills: 0 | Status: ACTIVE | COMMUNITY

## 2024-03-27 RX ORDER — HYDROXYZINE HCL 25 MG
25 TABLET ORAL
Refills: 0 | Status: ACTIVE | COMMUNITY

## 2024-03-27 NOTE — PHYSICAL EXAM
[Well Nourished] : well nourished [Alert] : alert [No Acute Distress] : no acute distress [Well Developed] : well developed [Normal Sclera/Conjunctiva] : normal sclera/conjunctiva [EOMI] : extra ocular movement intact [No Proptosis] : no proptosis [Normal Oropharynx] : the oropharynx was normal [Thyroid Not Enlarged] : the thyroid was not enlarged [No Thyroid Nodules] : no palpable thyroid nodules [No Respiratory Distress] : no respiratory distress [No Accessory Muscle Use] : no accessory muscle use [Clear to Auscultation] : lungs were clear to auscultation bilaterally [Normal S1, S2] : normal S1 and S2 [Normal Rate] : heart rate was normal [Regular Rhythm] : with a regular rhythm [No Edema] : no peripheral edema [Pedal Pulses Normal] : the pedal pulses are present [Normal Bowel Sounds] : normal bowel sounds [Not Tender] : non-tender [Not Distended] : not distended [Soft] : abdomen soft [Normal Anterior Cervical Nodes] : no anterior cervical lymphadenopathy [Normal Posterior Cervical Nodes] : no posterior cervical lymphadenopathy [No Spinal Tenderness] : no spinal tenderness [Spine Straight] : spine straight [No Stigmata of Cushings Syndrome] : no stigmata of Cushings Syndrome [Normal Gait] : normal gait [Normal Strength/Tone] : muscle strength and tone were normal [No Rash] : no rash [Normal Reflexes] : deep tendon reflexes were 2+ and symmetric [No Tremors] : no tremors [Oriented x3] : oriented to person, place, and time [Acanthosis Nigricans] : no acanthosis nigricans

## 2024-03-27 NOTE — HISTORY OF PRESENT ILLNESS
[FreeTextEntry1] : [place]   - [id number] second  [id number] 40 yo with migraines, hyperlipidemia, prediabetes, sleep apnea is here for weight management  For breakfast, pt eats bagel and eggs.  For lunch and dinner, pasta, pizza, meat with rice.  for snacking, sandwich and coffee.  She also eats ice-cream, cakes and cookies.  When she is stressed, she eats a lot Has 5 year old kid.  No family history of diabetes She works at home from the [employer] denies drinking and smoking  does not exercise Recent labs showed glucose 89, Cr 0.71, total cholesterol 279, HDL 65, , a1c 5.8 Currently weigh 38.25 with BMI, 15 years ago was her lightest weigh was 68 kg Not on birthcontrol pills.  gets period every 4 weeks.   Denies being on steroids pt states she has had 3 episodes of seizures in the past pt has taken regular metformin for 1 year but did not tolerate due to GI side effects

## 2024-03-27 NOTE — ASSESSMENT
[FreeTextEntry1] : # BMI 38 # prediabetes - a1c 6.2 - pt appears to live in sedentary life style, busy life at home - diet seems to be poor - will give referral for nutritionist - advised extensively that pt needs to keep up with 150 minutes/week with moderate intensity exercise - advised to work on reducing calories such as cutting out bagels and pizza, low amount of carbohydrate, high fiber with vegetables and met - options for drug reviewed: - naltrexone-bupropion should be avoided since pt had seizure disorder - phentermine-topiramate - no obvious contraindications could be an option down the line if the metformin or GLP-1 agonist are not tolerated - Will start metformin  daily to be taken with breakfast pt agreeable - advised her that without changes in the diet, the medications would not work - pt will speak to her primary care doctor about elevated cholesterol - check TFTs and BMP  d/w Dr. Kaplan

## 2024-03-27 NOTE — END OF VISIT
[facility] Hospital Medicine  Progress Note    Patient Name: Abdoul Villalta  MRN: [id number]  Patient Class: IP- Inpatient   Admission Date: 9/22/2020  Length of Stay: 3 days  Attending Physician: Bubba Hung MD  Primary Care Provider: Luis Alberto Harris MD    [facility] Medicine Team: [facility] 1 Steffanie Hinson MD    Subjective:     Principal Problem:Hepatic abscess        HPI:  Ms Villalta is a 24 year old female with past medical history of Crohn's disease (dx 2008), asthma and anxiety that comes to the ED complaining of abdominal pain x 2 days. The pain is located on the left upper side of her abdomen and radiates towards her back. It is described as an achy pain. She has tried Tylenol to alleviate the pain but it has not relieved the pain. Lying on her back makes the pain worse.  It is described as a 6/10 but at its worst it can become a 10 out of 10. Pt reports bright red blood in stools. Of note, she experiences bloody stools regularly and has not noted any changes. She also endorses SOB and loss of appetite x 2 days. She attributes the SOB to the pain. She denies: nausea, vomiting, cough, fever, chills, weakness, traveling and eating uncooked meat. She also denies chest pain or recent weight loss.    At the ED patient was afebrile. Her CBC was remarkable for thrombocytosis. Her inflammatory markers were all elevated (CRP: 125.6 and Sed rate: 74). Both lipase and lactic acid were WNL. CT scan abdomen/pelvis was remarkable for a 3.7 cm hypodensity in the L hepatic lobe. Ill defined splenic hypodensities were also found along with a perisplenic fluid collection. Breathing at room air.      Crohn's disease history:    She was diagnosed when she was 12 years old. Currently not taking any medications. Has multiple bowel movements a day (x3) and sometimes she notices bright red blood in the toilet. She has taken Remicade with good response but it's been months since she last used  [FreeTextEntry3] : 39yoF h/o migraines, seizures, obesity c/b preDM, PHILLIP here for weight management.  Current BMI 38 with weight 107.5kg.  Diet: bagel, eggs, pizza, pasta, meat with rice. Sometimes snacks during the day. Eats ice cream, cake, cookies throughout the day.   She's tried metformin for 1 year before but had diarrhea so stopped. Meds: Topiramate 150mg qhs for migraines.  We counseled on lifestyle modification including diet and exercise. Also discussed weight loss medications including a trial of metformin 500mg ER with a meal daily or adding phentermine (since she's already on topiramate). She says she'd prefer to proceed with metformin ER.  it. Pt was following up with GI but last time she saw them was a year ago due to lack of insurance. Now, she has new job as a  at [employer] and she has insurance that covers her visits with GI. She desires to establish care with Gastroenterology OP. According to patient she has not experienced a flare-up of her Crohn's in years. Her current pain is different from her prior Crohn's flare.     She [place] [facility] [place] for abdominal pain and at the time GI stated that there were not any acute interventions required at the moment and recommended FU OP.    Overview/Hospital Course:  Pt admitted to hospital medicine on 9/23 and underwent left heptic lobe abscess drainage converted to liver biopsy due to unable to drain fluid. She was started on Vancomycin, Ceftriaxone, and metronidazoles. On 9/25, vanc was discontinued. Doxycycline was initiated for treatment of back abscess, HS, and atypical organisms. Ceftriaxone/ metronidazole were continued. She continued to report abdominal pain even on HD 4.    Interval History: No acute events overnight. Pt inquisitive about pathology results, which have not resulted yet. Endorses no improvement in LUQ dull addominal pain.    Review of Systems   Constitutional: Positive for chills and fever. Negative for activity change, fatigue and unexpected weight change.   HENT: Negative for congestion, drooling, mouth sores, sinus pressure and sinus pain.    Eyes: Negative for visual disturbance.   Respiratory: Negative for cough, choking, chest tightness, shortness of breath, wheezing and stridor.    Cardiovascular: Negative for chest pain, palpitations and leg swelling.   Gastrointestinal: Positive for abdominal pain. Negative for abdominal distention, anal bleeding, blood in stool, constipation, diarrhea, nausea, rectal pain and vomiting.   Endocrine: Negative for polyuria.   Genitourinary: Negative for difficulty urinating, dysuria, flank pain, frequency, urgency and vaginal  [] : Fellow discharge.   Musculoskeletal: Negative for arthralgias, gait problem, myalgias and neck pain.   Skin: Positive for wound. Negative for color change, pallor and rash.   Neurological: Negative for dizziness, tremors, seizures, facial asymmetry, weakness, numbness and headaches.   Psychiatric/Behavioral: Positive for sleep disturbance. Negative for agitation, behavioral problems, confusion and decreased concentration. The patient is not nervous/anxious.      Objective:     Vital Signs (Most Recent):  Temp: (!) 101.6 °F (38.7 °C) (09/26/20 1137)  Pulse: (!) 114 (09/26/20 1151)  Resp: 18 (09/26/20 1151)  BP: 134/80 (09/26/20 1150)  SpO2: 98 % (09/26/20 1150) Vital Signs (24h Range):  Temp:  [98.5 °F (36.9 °C)-101.7 °F (38.7 °C)] 101.6 °F (38.7 °C)  Pulse:  [] 114  Resp:  [18-36] 18  SpO2:  [89 %-99 %] 98 %  BP: ()/(50-80) 134/80     Weight: (!) 162.5 kg (358 lb 4 oz)  Body mass index is 54.47 kg/m².    Intake/Output Summary (Last 24 hours) at 9/26/2020 1226  Last data filed at 9/26/2020 1100  Gross per 24 hour   Intake 290 ml   Output 900 ml   Net -610 ml      Physical Exam  Constitutional:       General: She is not in acute distress.     Appearance: She is well-developed. She is not diaphoretic.   HENT:      Head: Normocephalic and atraumatic.      Nose: No congestion or rhinorrhea.      Mouth/Throat:      Mouth: Mucous membranes are moist.      Pharynx: Oropharynx is clear. No oropharyngeal exudate or posterior oropharyngeal erythema.   Eyes:      General: No scleral icterus.     Extraocular Movements: Extraocular movements intact.      Pupils: Pupils are equal, round, and reactive to light.   Neck:      Musculoskeletal: Normal range of motion and neck supple.      Thyroid: No thyromegaly.      Vascular: No JVD.   Cardiovascular:      Rate and Rhythm: Normal rate and regular rhythm.      Heart sounds: Normal heart sounds. No murmur. No friction rub. No gallop.    Pulmonary:      Effort: Pulmonary effort is  normal. No respiratory distress.      Breath sounds: Normal breath sounds. No stridor. No wheezing or rales.   Chest:      Chest wall: No tenderness.   Abdominal:      General: Bowel sounds are normal. There is no distension.      Palpations: Abdomen is soft. There is no mass.      Tenderness: There is abdominal tenderness. There is no guarding or rebound.      Hernia: No hernia is present.   Musculoskeletal: Normal range of motion.         General: No swelling, deformity or signs of injury.   Skin:     General: Skin is warm and dry.      Capillary Refill: Capillary refill takes less than 2 seconds.      Coloration: Skin is pale. Skin is not jaundiced.      Findings: Erythema and lesion present. No bruising or rash.      Comments: (Hydradenitis suppurativa, lesion to R back)   Neurological:      Mental Status: She is alert and oriented to person, place, and time.      Cranial Nerves: No cranial nerve deficit.      Sensory: No sensory deficit.      Motor: No abnormal muscle tone.      Coordination: Coordination normal.      Deep Tendon Reflexes: Reflexes normal.   Psychiatric:         Behavior: Behavior normal.         Thought Content: Thought content normal.         Judgment: Judgment normal.         Significant Labs:   Blood Culture: No results for input(s): LABBLOO in the last 48 hours.  CBC:   Recent Labs   Lab 09/25/20  0758 09/26/20  0846   WBC 7.78 8.94   HGB 9.8* 9.3*   HCT 31.0* 30.7*    342     CMP:   Recent Labs   Lab 09/25/20  0622 09/26/20  0519   * 136   K 3.5 3.9    102   CO2 26 25   * 97   BUN 8 9   CREATININE 0.7 0.6   CALCIUM 8.1* 8.2*   PROT 6.3 6.1   ALBUMIN 2.5* 2.4*   BILITOT 0.3 0.2   ALKPHOS 118 166*   AST 10 16   ALT 24 25   ANIONGAP 7* 9   EGFRNONAA >60.0 >60.0       Significant Imaging: I have reviewed all pertinent imaging results/findings within the past 24 hours.      Assessment/Plan:      * Hepatic abscess  Ms iVllalta is a 24 year old female with past medical  history of Crohn's disease (dx 2008), asthma and anxiety that comes to the ED complaining of abdominal pain x 2 days. In the ED patient was afebrile. Her CBC was remarkable for thrombocytosis, no leukocytosis present. Her inflammatory markers were all elevated (CRP: 125.6 and Sed rate: 74). Both lipase and lactic acid were WNL. CT scan abdomen/pelvis was remarkable for a 3.7 cm hypodensity in the L hepatic lobe. Ill defined splenic hypodensities were also found along with a perisplenic fluid collection. Most likely diagnosis intraabdominal abscess as a complication of Chron's disease.  Other infectious causes cannot be ruled out but unlikely.    S/p IR drainage 09/26/2020 Cultures lost. Awaiting path results.   Patient with back abscess - concerning for source of infection.   Blcx with CNS likely contaminant. D/c vanc. Start doxy    Plan  -ID following, appreciate recs.  - Discontinued vancomycin IV as CoNS in blood cultures is a contaminant  - Started doxycycline 100 mg PO BID for treatment of back abscess, HS, atypical organisms  - Continuing ceftriaxone / metronidazole  - starting fluconazole  - General Surgery consulted--s/p roxie at bedside: send for aerobic/anaerobic bacterial / fungal / AFB cultures  - F/u labs that ID ordered: HIV, RPR, FTA-Abs, GC/chlamydia, bartonella, fungitell, urine histo/blasto, Crypto Ag, Aspergillus Ag, entamoeba Ab, echinococcus Ab, bartonella Ab    Cutaneous abscess of right lower extremity  - wound care following, appreciate recs.      Hidradenitis suppurativa  - Wound care consulted for assistance with underarm & inguinal region  - follow up with dermatology outpatient.      Abdominal pain despite therapy for Crohn's disease  GI consulted, recommend establishing care in clinic.      Abscess of spleen  Ms Villalta is a 24 year old female with past medical history of Crohn's disease (dx 2008), asthma and anxiety that comes to the ED complaining of abdominal pain x 2 days.     At  the ED patient was afebrile. Her CBC was remarkable for thrombocytosis, no leukocytosis present. Her inflammatory markers were all elevated (CRP: 125.6 and Sed rate: 74). Both lipase and lactic acid were WNL.       CT scan abdomen/pelvis was remarkable for a 3.7 cm hypodensity in the L hepatic lobe. Ill defined splenic hypodensities were also found along with a perisplenic fluid collection.    Most likely diagnosis intraabdominal abscess as a complication of Chron's disease, bacterial origin suspected. Other infectious causes cannot be ruled out but unlikely.      Plan  · S/p IR drainage. F/u cultures (not sent to micro bio). F/u pathology   · Started Ceftriaxone 1 g q 12 hours & Metronidazole 500 mg q 8 hours on 9/24. Continue treatment for 4-7 days (currently day 2/4)      Crohn disease  She was diagnosed when she was 12 years old (2008). Currently not taking any medications. Has multiple bowel movements a day (x3) and sometimes she notices bright red blood in the toilet. She has taken Remicade with good response but it's been months since she last used it. Pt was following up with GI but last time she saw them was a year ago due to lack of insurance. Now, she got a new job as a  at [employer] and she has insurance and desires to establish care with Gastroenterology OP.    Plan  · Consulted Gastroenterology: recommended re-establishing care with her prior GI doctor, establishing care with GI here, or establishing care with GI at [facility] for long-term treatment/management depending on patient's preferences.      VTE Risk Mitigation (From admission, onward)         Ordered     IP VTE HIGH RISK PATIENT  Once      09/23/20 0232     Place sequential compression device  Until discontinued      09/23/20 0049                Discharge Planning   WAYNE: 9/28/2020     Code Status: Full Code   Is the patient medically ready for discharge?: No    Reason for patient still in hospital (select all that apply): Laboratory test,  Treatment and Consult recommendations  Discharge Plan A: Home with family   Discharge Delays: None known at this time              Steffanie Hinson MD  Department of Hospital Medicine   [facility]

## 2024-03-28 LAB
ANION GAP SERPL CALC-SCNC: 12 MMOL/L
BUN SERPL-MCNC: 15 MG/DL
CALCIUM SERPL-MCNC: 9.6 MG/DL
CHLORIDE SERPL-SCNC: 102 MMOL/L
CO2 SERPL-SCNC: 22 MMOL/L
CREAT SERPL-MCNC: 0.73 MG/DL
EGFR: 107 ML/MIN/1.73M2
GLUCOSE SERPL-MCNC: 82 MG/DL
HBA1C MFR BLD HPLC: 6.2
POTASSIUM SERPL-SCNC: 4.2 MMOL/L
SODIUM SERPL-SCNC: 137 MMOL/L
TSH SERPL-ACNC: 2.41 UIU/ML

## 2024-03-29 ENCOUNTER — APPOINTMENT (OUTPATIENT)
Dept: PULMONOLOGY | Facility: CLINIC | Age: 39
End: 2024-03-29
Payer: COMMERCIAL

## 2024-03-29 VITALS
SYSTOLIC BLOOD PRESSURE: 127 MMHG | WEIGHT: 233 LBS | DIASTOLIC BLOOD PRESSURE: 83 MMHG | HEART RATE: 87 BPM | OXYGEN SATURATION: 96 % | HEIGHT: 66 IN | BODY MASS INDEX: 37.45 KG/M2 | TEMPERATURE: 96.6 F

## 2024-03-29 DIAGNOSIS — R06.83 SNORING: ICD-10-CM

## 2024-03-29 DIAGNOSIS — G47.33 OBSTRUCTIVE SLEEP APNEA (ADULT) (PEDIATRIC): ICD-10-CM

## 2024-03-29 PROCEDURE — 99213 OFFICE O/P EST LOW 20 MIN: CPT

## 2024-03-29 NOTE — HISTORY OF PRESENT ILLNESS
[FreeTextEntry1] : Overnight sleep study from March 19, 2024 reviewed with patient.  She thought this was a worse night sleep than usual, and that she only slept a few hours but actually slept a little over 5 hours.  Sleep was mildly fragmented, she had mild sleep disordered breathing, apnea-hypopnea index 4.7 using CMS criteria and no oxygen saturation below 89%.

## 2024-03-29 NOTE — ASSESSMENT
[FreeTextEntry1] : Mild sleep disordered breathing Informed that mild sleep disordered breathing is not likely associated with increased cardiovascular risk, but treatment may improve daytime sleepiness in some patients.  Urged to try to achieve and  maintain a healthy weight and avoid alcohol or other sedating medications close to bedtime.  Should be re-evaluated if develops excessive daytime somnolence or weight gain.

## 2024-04-01 RX ORDER — TOPIRAMATE 50 MG/1
50 TABLET, FILM COATED ORAL
Qty: 30 | Refills: 3 | Status: ACTIVE | COMMUNITY
Start: 2022-06-01 | End: 1900-01-01

## 2024-04-17 ENCOUNTER — APPOINTMENT (OUTPATIENT)
Dept: NEUROLOGY | Facility: CLINIC | Age: 39
End: 2024-04-17
Payer: COMMERCIAL

## 2024-04-17 ENCOUNTER — APPOINTMENT (OUTPATIENT)
Dept: PULMONOLOGY | Facility: CLINIC | Age: 39
End: 2024-04-17

## 2024-04-17 VITALS
DIASTOLIC BLOOD PRESSURE: 80 MMHG | HEIGHT: 66 IN | WEIGHT: 226 LBS | HEART RATE: 93 BPM | BODY MASS INDEX: 36.32 KG/M2 | OXYGEN SATURATION: 98 % | SYSTOLIC BLOOD PRESSURE: 122 MMHG | TEMPERATURE: 96.5 F

## 2024-04-17 DIAGNOSIS — R51.9 HEADACHE, UNSPECIFIED: ICD-10-CM

## 2024-04-17 PROCEDURE — G2211 COMPLEX E/M VISIT ADD ON: CPT

## 2024-04-17 PROCEDURE — 99214 OFFICE O/P EST MOD 30 MIN: CPT

## 2024-04-17 RX ORDER — TOPIRAMATE 100 MG/1
100 TABLET, FILM COATED ORAL
Qty: 30 | Refills: 6 | Status: ACTIVE | COMMUNITY
Start: 2021-09-10 | End: 1900-01-01

## 2024-04-17 RX ORDER — DICLOFENAC SODIUM 50 MG/1
50 TABLET, DELAYED RELEASE ORAL TWICE DAILY
Qty: 30 | Refills: 6 | Status: ACTIVE | COMMUNITY
Start: 2023-05-26 | End: 1900-01-01

## 2024-04-17 NOTE — DISCUSSION/SUMMARY
[FreeTextEntry1] : 38 yo F w/ migraines, depression presents for hx of epilepsy and migraines MRI brain 8/19/2021- scattered white matter changes  plan: #Hx of focal epilepsy- seizure free for many years, off meds since 2009 AEEG 3/22-3/23/2022- left temporal slowing AEEG 2/2-2/4/2024 unremarkable   #migraine/headache Decrease TPM back to 100mg/daily (can increase back to 150mg in headaches increase again) Diclofenac PRN  F/u in 6 months

## 2024-04-17 NOTE — HISTORY OF PRESENT ILLNESS
[FreeTextEntry1] : Interim Hx: 4/27/2024  Overall feeling better Stopped eating sweets which she thinks helped. No longer has morning headaches, now just gets them when stressed. Interested in lowering TPM again  Saw Sleep specialist. Sleep study- mild sleep apnea. Recommendations were to achieve and maintain a healthy weight and avoid alcohol or other sedating medications close to bedtime. Also following with endocrine   AEEG 2/2-2/4/2024 unremarkable  ____________________ Interim Hx: 1/10/2024  Headaches increased over the summer. Now occurring near daily. Started after reducing TPM to 100mg. No improvement in fatigue. Does not recall if diclofenac helped. Also mentions headache worse upon awakening. +apneic episodes, snoring, daytime fatigue.  Also had an episode where she passed out. Went to [facility] ER Had viral illness, unsure if she had a fever. Was laying in bed and had brief LOC, described as brief black vision and seconds of LOC (reported by ). No convulsions. _____________________ Interim Hx: 5/26/2023  Headaches continue to be better, now occurs once every few weeks She is unsure if the naprosyn helps anymore  Still has less energy, routine has changed taking care of son more ____________________ Interim Hx: 10/3/2022  Headache frequency now 2-3x/month TPM 150mg/daily Naprosyn as abortive No major side effects  Does report she has recently gained weight and feels less energized. Had blood work with PMD 9/30- only CBC so far resulted  _________________ Interim Hx: 6/1/2022  Headache frequency 5x/month TPM 100mg/daily Naprosyn as abortive No side effects   Ambulatory EEG 3/22-3/23/2022- left temporal slowing Patient reports she was previously told epilepsy effected left side. ___________________ Interim Hx: 2/1/2022  Headaches now 3-4x/month Covid- Last month (no change in headaches) Continues TPM 75mg/daily No side effects, previous weight loss regained.  No seizures EMU admission not scheduled  yet Would like to do this month- will be undergoing divorce in upcoming months ____________________ Interim Hx: 11/2/2021  Patient reports that after starting TPM  initially was experiencing loss of appetite and headaches. 3 weeks later headaches went away and appetite came back. Based on chart review has lost 7Ibs since last visit. Patient believes she has regained some weight since appetite has returned. In the last week had a headache that lasted 5 days continuously and did not respond to Naprosyn.  No seizures. Routine EEG normal EMU admission not scheduled yet ___________________ Interim Hx: 9/10/2021  Patient report increasing headaches, has been having daily headaches. States over past 5 years headache frequency has been increasing. Has been taking ibuprofen often Also reports she pulled neck/back lifting an item and has been having pain  No seizures reported. EEG not completed yet, would like to do inpatient because her soon will pull of the leads  MRI brain 8/19/2021- white matter changes  _________________ Initial Hx: 7/20/2021 35 yo F presents for epilepsy eval  Patient reports she was diagnosed with cryptogenic focal epilepsy. 3 convulsive seizures in lifetime- 2003, 2004, 2005. Preceded by stressful situations Description- confused for 15 minutes (does not recall this period of time) before convulsive seizure w/ urinary incontinence  She reports she took small dose of Finlepsin (CBZ in [place]) from 2003 to 2009. Neurologist in 2009 d/franco meds at patients request Since then no convulsive seizures reported. 2 times transient LOC- last time in  2012 [place] (so [place] fall),  once occurred at home after drinking wine  Following now with psychologist for anxiety/depression who advised for "fresh EEG." Now on Prozac 40mg/daily (for past 3 month- helping w/ depression), Ativan 1.5mg/day (just this week for sleep) Also reports severe migraines, sleep disturbances  Previously had anxiety/depression at the time of seizures. Did not want to go out fear of having seizure  No FHx of epilepsy, denies head trauma, meningitis   Son autistic spectrum (does not sleep at night) +family stress Not driving

## 2024-04-17 NOTE — REASON FOR VISIT
[Follow-Up: _____] : a [unfilled] follow-up visit [[employer] ] : provided by [employer]   [Interpreters_IDNumber] : [id number] [[id number]] : [place]

## 2024-04-17 NOTE — DATA REVIEWED
[de-identified] : EXAM:  MR BRAIN SEIZURE EPILEPSY 3D#\par  \par  PROCEDURE DATE:  08/19/2021\par  \par  \par  \par  INTERPRETATION:  PROCEDURE: MRI Brain without contrast\par  \par  INDICATION: [Focal epilepsy]\par  \par  TECHNIQUE: Sagittal T1, axial T2, FLAIR, diffusion, gradient echo, and coronal T2, FLAIR hippocampal images of the brain were obtained.\par  \par  COMPARISON: None\par  \par  FINDINGS:\par  \par  The ventricles and sulci are normal in size and configuration.\par  \par  There is no mass, mass effect, midline shift or extra-axial collection. There is no acute intracranial hemorrhage.\par  \par  There are few scattered punctate foci of signal hyperintensity within the subcortical and periventricular white matter on FLAIR sequence that are nonspecific in this age demographic and may reflect sequela of minimal chronic microvascular ischemia versus gliosis of other etiologies. There is otherwise no abnormal signal identified within the brain parenchyma. The diffusion-weighted images demonstrate no recent infarction.\par  \par  \par  \par  Hippocampi are symmetric in signal and morphology without asymmetric atrophy of the fornices and mamillary bodies identified to suggest mesial temporal sclerosis.\par  \par  \par  There is no susceptibility related signal loss to suggest hemosiderin deposition or calcification.\par  \par  The vascular flow voids are present.\par  \par  The sella and suprasellar regions are unremarkable.\par  \par  The visualized paranasal sinuses are well-aerated. The mastoid air cells are well-aerated.\par  \par  \par  IMPRESSION:\par  \par  No acute intracranial hemorrhage, acute infarction, extra-axial fluid collection or hydrocephalus.\par  \par  No MR evidence for mesial temporal sclerosis.\par  \par  --- End of Report ---\par  \par  \par  \par  \par  Thank you for the opportunity to participate in the care of this patient.\par  \par  CAM ARTIS MD; Resident Radiologist\par  This document has been electronically signed.\par  THOMAS REYNOSO MD; Attending Radiologist\par  This document has been electronically signed. Aug 24 2021  3:21PM\par  \par   \par

## 2024-05-06 ENCOUNTER — APPOINTMENT (OUTPATIENT)
Dept: DERMATOLOGY | Facility: CLINIC | Age: 39
End: 2024-05-06
Payer: COMMERCIAL

## 2024-05-06 DIAGNOSIS — L70.0 ACNE VULGARIS: ICD-10-CM

## 2024-05-06 DIAGNOSIS — F42.4 EXCORIATION (SKIN-PICKING) DISORDER: ICD-10-CM

## 2024-05-06 PROCEDURE — 99214 OFFICE O/P EST MOD 30 MIN: CPT

## 2024-05-06 RX ORDER — DOXYCYCLINE 100 MG/1
100 TABLET, FILM COATED ORAL
Qty: 60 | Refills: 1 | Status: ACTIVE | COMMUNITY
Start: 2024-05-06 | End: 1900-01-01

## 2024-05-06 RX ORDER — CLINDAMYCIN PHOSPHATE 10 MG/ML
1 LOTION TOPICAL TWICE DAILY
Qty: 2 | Refills: 2 | Status: ACTIVE | COMMUNITY
Start: 2024-02-05 | End: 1900-01-01

## 2024-05-06 NOTE — PHYSICAL EXAM
[FreeTextEntry3] : Focused skin exam performed  The relevant portions of the exam were performed today  AAOx3, NAD, well-appearing / pleasant Focused examination within normal limits with the exception of:  Few acneiform papules over forehead, cheeks, chin Numerous round skin colored atrophic scars over back, lateral thighs> chest admixed with pink papules with overlying geometric excoriations

## 2024-05-06 NOTE — ASSESSMENT
[FreeTextEntry1] : #Acne - Chronic, flaring - C/w BPO wash (5% or less to face, 10% to body daily)- instructed to keep in cool, dark area - Clinda lotion qam, SED - Start tretinoin 0.025% cream. SED. Discussed proper use, including using a pea-sized amount for entire face, starting every other night and increasing to nightly use as tolerated, and liberal use of oil-free, non-comedogenic moisturizer such as Cetaphil or CeraVe - Start doxycycline 100mg BID x 2 mo. SED including but not limited to GI upset, esophagitis, photosensitivity, headache. Instructed to take pill with full glass of water before meal without dairy and remain upright for 1 hour after taking medication. - Reports flares with menses but did not think antonio 100mg helped. May consider re-trial at higher dose  #Skin picking - Sig component of picking likely 2/2 some underlying anxiety, c/w bupropion (recent dose increase) as well as continued psych care  - Can try OTC NAC. SED. Start with 600mg daily, if tolerating well after a month increase to bid  RTC 3 months

## 2024-05-06 NOTE — HISTORY OF PRESENT ILLNESS
[FreeTextEntry1] : ROSANGELA acne [de-identified] : BRUCE DE JESUS is a 39 year old F who presents for evaluation of the following  1. Acne- on face, back, upper lateral thighs Present for about 5 years since childbirth  Using curology formula (with numerous ingredients- including AA, niacinamide, tretinoin, clindamycin, spironolactone, etc) all in one formulation  Feels like face flares with menses Was previously on antonio 100mg for about a year wtihout much noticeable improvement  Notes she has some anxiety that leads to her picking the lesions- notes improvement since starting bupropion  -5/6/2024: Minimal improvement while on clinda, though patient ran out for a month-since then thinks it has been worse. Also using BPO to face and body. Still picking. Psychiatrist went up on bupropion yesterday  SH: [place]ian  Madan ID [id number] used for today's encounter

## 2024-05-08 RX ORDER — TRETINOIN 0.25 MG/G
0.03 CREAM TOPICAL
Qty: 1 | Refills: 3 | Status: ACTIVE | COMMUNITY
Start: 2024-05-06

## 2024-05-13 ENCOUNTER — APPOINTMENT (OUTPATIENT)
Dept: ENDOCRINOLOGY | Facility: CLINIC | Age: 39
End: 2024-05-13
Payer: COMMERCIAL

## 2024-05-13 PROCEDURE — 97802 MEDICAL NUTRITION INDIV IN: CPT

## 2024-06-12 ENCOUNTER — RESULT CHARGE (OUTPATIENT)
Age: 39
End: 2024-06-12

## 2024-06-12 ENCOUNTER — APPOINTMENT (OUTPATIENT)
Dept: ENDOCRINOLOGY | Facility: CLINIC | Age: 39
End: 2024-06-12
Payer: COMMERCIAL

## 2024-06-12 VITALS
SYSTOLIC BLOOD PRESSURE: 130 MMHG | BODY MASS INDEX: 36.64 KG/M2 | DIASTOLIC BLOOD PRESSURE: 81 MMHG | WEIGHT: 227 LBS | HEART RATE: 83 BPM

## 2024-06-12 DIAGNOSIS — E78.5 HYPERLIPIDEMIA, UNSPECIFIED: ICD-10-CM

## 2024-06-12 DIAGNOSIS — R73.03 PREDIABETES.: ICD-10-CM

## 2024-06-12 LAB — HBA1C MFR BLD HPLC: 6

## 2024-06-12 PROCEDURE — 99214 OFFICE O/P EST MOD 30 MIN: CPT | Mod: GC

## 2024-06-12 RX ORDER — METFORMIN ER 500 MG 500 MG/1
500 TABLET ORAL
Qty: 90 | Refills: 0 | Status: ACTIVE | COMMUNITY
Start: 2024-03-27 | End: 1900-01-01

## 2024-06-12 NOTE — PHYSICAL EXAM
[Alert] : alert [Well Nourished] : well nourished [No Acute Distress] : no acute distress [Well Developed] : well developed [Normal Sclera/Conjunctiva] : normal sclera/conjunctiva [EOMI] : extra ocular movement intact [No Proptosis] : no proptosis [Normal Oropharynx] : the oropharynx was normal [Thyroid Not Enlarged] : the thyroid was not enlarged [No Thyroid Nodules] : no palpable thyroid nodules [No Respiratory Distress] : no respiratory distress [No Accessory Muscle Use] : no accessory muscle use [Clear to Auscultation] : lungs were clear to auscultation bilaterally [Normal S1, S2] : normal S1 and S2 [Normal Rate] : heart rate was normal [Regular Rhythm] : with a regular rhythm [No Edema] : no peripheral edema [Pedal Pulses Normal] : the pedal pulses are present [Normal Bowel Sounds] : normal bowel sounds [Not Tender] : non-tender [Not Distended] : not distended [Soft] : abdomen soft [Normal Anterior Cervical Nodes] : no anterior cervical lymphadenopathy [No Spinal Tenderness] : no spinal tenderness [Spine Straight] : spine straight [No Stigmata of Cushings Syndrome] : no stigmata of Cushings Syndrome [Normal Gait] : normal gait [Normal Strength/Tone] : muscle strength and tone were normal [Normal Reflexes] : deep tendon reflexes were 2+ and symmetric [Oriented x3] : oriented to person, place, and time

## 2024-06-14 ENCOUNTER — NON-APPOINTMENT (OUTPATIENT)
Age: 39
End: 2024-06-14

## 2024-06-17 RX ORDER — SEMAGLUTIDE 0.25 MG/.5ML
0.25 INJECTION, SOLUTION SUBCUTANEOUS
Qty: 2 | Refills: 2 | Status: ACTIVE | COMMUNITY
Start: 2024-06-12 | End: 1900-01-01

## 2024-06-18 ENCOUNTER — TRANSCRIPTION ENCOUNTER (OUTPATIENT)
Age: 39
End: 2024-06-18

## 2024-06-19 ENCOUNTER — TRANSCRIPTION ENCOUNTER (OUTPATIENT)
Age: 39
End: 2024-06-19

## 2024-06-20 NOTE — HISTORY OF PRESENT ILLNESS
[FreeTextEntry1] : 40 yo with migraines, hyperlipidemia, prediabetes, sleep apnea is here for weight management  [place]  [id number] 226 lbs last time, 227 lbs today seeing nutritionist and pt states she is following the diet that nutrionist recommends taking metformin 500 bid - denies stomach issues - bloating nausea, vomiting walks with a dog 1-1.5 hour/day - sweats for breakfast: eggs, yogurt, fruits.  for lunch and dinner, chicken, turkey, salad.  Focusing on healthy version of carbohydrates.  Plant milk and eating snacks more frequentl  HPI 03/2023 For breakfast, pt eats bagel and eggs. For lunch and dinner, pasta, pizza, meat with rice. for snacking, sandwich and coffee. She also eats ice-cream, cakes and cookies. When she is stressed, she eats a lot Has 5 year old kid. No family history of diabetes She works at home from the computer denies drinking and smoking does not exercise Recent labs showed glucose 89, Cr 0.71, total cholesterol 279, HDL 65, , a1c 5.8 Currently weigh 38.25 with BMI, 15 years ago was her lightest weigh was 68 kg Not on birthcontrol pills. gets period every 4 weeks. Denies being on steroids pt states she has had 3 episodes of seizures in the past pt has taken regular metformin for 1 year but did not tolerate due to GI side effects

## 2024-06-20 NOTE — ASSESSMENT
[FreeTextEntry1] : # BMI 37 # prediabetes - a1c 6.0 today, BMI relatively unchanged - diet improved and exercise is adequate - however the BMI did not improve on metformin - pt should continue to see nutrionist - options at this point are: - naltrexone-bupropion should be avoided since pt had seizure disorder - phentermine-topiramate - no obvious contraindications could be an option down the line if the metformin or GLP-1 agonist are not tolerated - c/w metformin  daily to be taken with breakfast  - will start Wegovy 0.25 weekly - counselled on side effects including rats studies that showed risk of thyroid cancer, GI side effects including constipation, diarrhea, pancreatitis and gallbladder inflammation and worsening of diabetic retinopathy - repeat fasting lipid panel - 3 months follow up  d/w Dr. Dykes

## 2024-07-03 ENCOUNTER — APPOINTMENT (OUTPATIENT)
Dept: ENDOCRINOLOGY | Facility: CLINIC | Age: 39
End: 2024-07-03

## 2024-08-05 ENCOUNTER — APPOINTMENT (OUTPATIENT)
Dept: DERMATOLOGY | Facility: CLINIC | Age: 39
End: 2024-08-05

## 2024-08-06 ENCOUNTER — APPOINTMENT (OUTPATIENT)
Dept: ENDOCRINOLOGY | Facility: CLINIC | Age: 39
End: 2024-08-06

## 2024-08-16 ENCOUNTER — APPOINTMENT (OUTPATIENT)
Dept: DERMATOLOGY | Facility: CLINIC | Age: 39
End: 2024-08-16
Payer: COMMERCIAL

## 2024-08-16 DIAGNOSIS — F42.4 EXCORIATION (SKIN-PICKING) DISORDER: ICD-10-CM

## 2024-08-16 DIAGNOSIS — Z79.899 OTHER LONG TERM (CURRENT) DRUG THERAPY: ICD-10-CM

## 2024-08-16 DIAGNOSIS — L90.5 SCAR CONDITIONS AND FIBROSIS OF SKIN: ICD-10-CM

## 2024-08-16 DIAGNOSIS — L70.0 ACNE VULGARIS: ICD-10-CM

## 2024-08-16 PROCEDURE — 99214 OFFICE O/P EST MOD 30 MIN: CPT

## 2024-08-16 NOTE — HISTORY OF PRESENT ILLNESS
[FreeTextEntry1] : ROSANGELA acne [de-identified] : BRUCE DE JESUS is a 39 year old F who presents for evaluation of the following  1. Acne- on face, back, upper lateral thighs Present for about 5 years since childbirth  Using curology formula (with numerous ingredients- including AA, niacinamide, tretinoin, clindamycin, spironolactone, etc) all in one formulation  Feels like face flares with menses Was previously on antonio 100mg for about a year wtihout much noticeable improvement  Notes she has some anxiety that leads to her picking the lesions- notes improvement since starting bupropion  -5/6/2024: Minimal improvement while on clinda, though patient ran out for a month-since then thinks it has been worse. Also using BPO to face and body. Still picking. Psychiatrist went up on bupropion yesterday -8/16/24: Did well on Doxy for 2 months, but then came back once stopped it. Using BPO/clinda/tret but does not feel like it is controlling the acne. Still picking- was on NAC for a bit but then went away and forgot to re-start it, did not feel like it was helping. Notes some flares with menses, particularly on face  SH: Russian

## 2024-08-16 NOTE — ASSESSMENT
[FreeTextEntry1] : #Acne - Chronic, flaring - C/w BPO wash (5% or less to face, 10% to body daily)- instructed to keep in cool, dark area - Clinda lotion qam, SED - C/w tretinoin 0.025% cream. SED. Discussed proper use, including using a pea-sized amount for entire face, starting every other night and increasing to nightly use as tolerated, and liberal use of oil-free, non-comedogenic moisturizer such as Cetaphil or CeraVe - Disc Accutane vs Antonio. Pt with active depression and anxiety she is being managed for- she will discuss with her psychiatrist whether she would be an appropriate candidate for Accutane. Meanwhile, will start with antonio (has been on 100mg before, uncertain if helped) - Pending labs, start antonio 100mg nightly. SED.  #High risk med use - Will check cmp today and send antonio if K wnl - Also checking TGs in case decide on Accutane  #Skin picking - Sig component of picking likely 2/2 some underlying anxiety, c/w bupropion (recent dose increase) as well as continued psych care  - Can try OTC NAC. SED. Start with 600mg daily, if tolerating well after a month increase to bid  RTC 6 weeks

## 2024-09-11 ENCOUNTER — APPOINTMENT (OUTPATIENT)
Dept: ENDOCRINOLOGY | Facility: CLINIC | Age: 39
End: 2024-09-11
Payer: COMMERCIAL

## 2024-09-11 ENCOUNTER — RESULT CHARGE (OUTPATIENT)
Age: 39
End: 2024-09-11

## 2024-09-11 ENCOUNTER — RX RENEWAL (OUTPATIENT)
Age: 39
End: 2024-09-11

## 2024-09-11 VITALS
BODY MASS INDEX: 35.03 KG/M2 | SYSTOLIC BLOOD PRESSURE: 133 MMHG | HEIGHT: 66 IN | HEART RATE: 94 BPM | DIASTOLIC BLOOD PRESSURE: 85 MMHG | WEIGHT: 218 LBS

## 2024-09-11 DIAGNOSIS — Z83.3 FAMILY HISTORY OF DIABETES MELLITUS: ICD-10-CM

## 2024-09-11 DIAGNOSIS — R73.03 PREDIABETES.: ICD-10-CM

## 2024-09-11 DIAGNOSIS — Z86.59 PERSONAL HISTORY OF OTHER MENTAL AND BEHAVIORAL DISORDERS: ICD-10-CM

## 2024-09-11 DIAGNOSIS — E78.5 HYPERLIPIDEMIA, UNSPECIFIED: ICD-10-CM

## 2024-09-11 LAB
GLUCOSE BLDC GLUCOMTR-MCNC: 85
HBA1C MFR BLD HPLC: 5.7

## 2024-09-11 PROCEDURE — 99214 OFFICE O/P EST MOD 30 MIN: CPT | Mod: GC

## 2024-09-11 RX ORDER — FLUOXETINE HYDROCHLORIDE 40 MG/1
40 CAPSULE ORAL
Refills: 0 | Status: ACTIVE | COMMUNITY

## 2024-09-11 RX ORDER — BUPROPION HYDROCHLORIDE 200 MG/1
200 TABLET, FILM COATED, EXTENDED RELEASE ORAL
Refills: 0 | Status: ACTIVE | COMMUNITY

## 2024-09-11 RX ORDER — ATORVASTATIN CALCIUM 10 MG/1
10 TABLET, FILM COATED ORAL DAILY
Qty: 60 | Refills: 2 | Status: ACTIVE | COMMUNITY
Start: 2024-09-11 | End: 1900-01-01

## 2024-09-11 NOTE — ASSESSMENT
[FreeTextEntry1] : # BMI 35 # Prediabetes # Hyperlipidemia a1c 5.7 seeing nutritionist, f/u the visits counselled to c/w moderate intensity exercise of 150 mins/week pt started on Fluoxetine and Bupropion by psychiatrist  c/w Wegovy, will increase to 0.5 weekly - again counselled on side effects including rats studies that showed risk of thyroid cancer, GI side effects including constipation, diarrhea, pancreatitis and gallbladder inflammation and worsening of diabetic retinopathy LDL and total cholesterol suboptimal, ASCVD 10 year risk 0.5% - counselled to reduce eating fatty intake and provided reference for diet recommendation for HLD from Medline website given family history of HLD and underlying prediabetes, obesity, will start lipitor 10 mg daily - counselled on SE such as myopathy, transaminitis obtain CMP today 3 m f/u  d/w Dr. Escoto

## 2024-09-11 NOTE — HISTORY OF PRESENT ILLNESS
[FreeTextEntry1] : 40 yo F with migraines, hyperlipidemia, prediabetes, sleep apnea is here for weight management  [place]  [id number] - was not consistently used  09/11/2024 Started taking Wegovy 0.25 weekly for 3 months Currently 218 lbs from 227 lbs 3 months ago No nausea, vomiting, constipation, diarrhea pt feels as her weight flactuates breakfast: eggs, coffee, vegetables, soup.  Lunch and dinner: brown rice, meat, chicken.  Snacks on nuts, fruits, cheese, smoothies  Eats burgers and steak 2 times a week, 5 eggs a week exercise: jogging and running been seeing nutrionist  06/2024 226 lbs last time, 227 lbs today seeing nutritionist and pt states she is following the diet that nutrionist recommends taking metformin 500 bid - denies stomach issues - bloating nausea, vomiting walks with a dog 1-1.5 hour/day - sweats for breakfast: eggs, yogurt, fruits. for lunch and dinner, chicken, turkey, salad. Focusing on healthy version of carbohydrates. Plant milk and eating snacks more frequentl  HPI 03/2023 For breakfast, pt eats bagel and eggs. For lunch and dinner, pasta, pizza, meat with rice. for snacking, sandwich and coffee. She also eats ice-cream, cakes and cookies. When she is stressed, she eats a lot Has 5 year old kid. No family history of diabetes She works at home from the computer denies drinking and smoking does not exercise Recent labs showed glucose 89, Cr 0.71, total cholesterol 279, HDL 65, , a1c 5.8 Currently weigh 38.25 with BMI, 15 years ago was her lightest weigh was 68 kg Not on birthcontrol pills. gets period every 4 weeks. Denies being on steroids pt states she has had 3 episodes of seizures in the past

## 2024-09-11 NOTE — ADDENDUM
[FreeTextEntry1] : Attending:  Pt seen with Dr. Obando today.   --Has lost 9 lb in the past 3 months on Wegovy at minimum dose of 0.25 mg/week.  Has no side-effects, and will increase to 0.5 mg/week --A1c level is now barely into the pre-diabetes range at 5.7%.  To continue diet, metformin and weight loss --LDL-cholesterol level is well above the pre-diabetes target at 165 mg%.  Her diet still includes cheese, beef and baked goods.  The cheese is only every other day, the beef is 3X/week but < 6 oz, and the baked goods are not that frequent.  There is a history of hyperlipidemia in her mother, though her mother does not apparently have heart disease.  The pt's hyperlipidemia will likely respond somewhat to further dietary modification, not specifically to weight loss, but this is still mostly a genetic hyperlipidemia.  She is a candidate for statin therapy, and will start on atorvastatin.  This was explained in detail to the pt.  SERENA Escoto MD

## 2024-09-12 LAB
ALBUMIN SERPL ELPH-MCNC: 4.5 G/DL
ALP BLD-CCNC: 95 U/L
ALT SERPL-CCNC: 17 U/L
ANION GAP SERPL CALC-SCNC: 14 MMOL/L
AST SERPL-CCNC: 17 U/L
BILIRUB SERPL-MCNC: 0.4 MG/DL
BUN SERPL-MCNC: 20 MG/DL
CALCIUM SERPL-MCNC: 9.7 MG/DL
CHLORIDE SERPL-SCNC: 102 MMOL/L
CO2 SERPL-SCNC: 21 MMOL/L
CREAT SERPL-MCNC: 0.74 MG/DL
EGFR: 105 ML/MIN/1.73M2
GLUCOSE SERPL-MCNC: 102 MG/DL
POTASSIUM SERPL-SCNC: 4.4 MMOL/L
PROT SERPL-MCNC: 7.5 G/DL
SODIUM SERPL-SCNC: 137 MMOL/L

## 2024-09-19 ENCOUNTER — RX RENEWAL (OUTPATIENT)
Age: 39
End: 2024-09-19

## 2024-09-23 ENCOUNTER — RX RENEWAL (OUTPATIENT)
Age: 39
End: 2024-09-23

## 2024-09-24 ENCOUNTER — APPOINTMENT (OUTPATIENT)
Dept: ENDOCRINOLOGY | Facility: CLINIC | Age: 39
End: 2024-09-24
Payer: COMMERCIAL

## 2024-09-24 PROCEDURE — 97803 MED NUTRITION INDIV SUBSEQ: CPT

## 2024-09-27 ENCOUNTER — APPOINTMENT (OUTPATIENT)
Dept: DERMATOLOGY | Facility: CLINIC | Age: 39
End: 2024-09-27
Payer: COMMERCIAL

## 2024-09-27 VITALS — HEIGHT: 66 IN | BODY MASS INDEX: 35.03 KG/M2 | WEIGHT: 218 LBS

## 2024-09-27 DIAGNOSIS — L90.5 SCAR CONDITIONS AND FIBROSIS OF SKIN: ICD-10-CM

## 2024-09-27 DIAGNOSIS — F42.4 EXCORIATION (SKIN-PICKING) DISORDER: ICD-10-CM

## 2024-09-27 DIAGNOSIS — L70.0 ACNE VULGARIS: ICD-10-CM

## 2024-09-27 PROCEDURE — 99214 OFFICE O/P EST MOD 30 MIN: CPT

## 2024-09-27 RX ORDER — SPIRONOLACTONE 50 MG/1
50 TABLET ORAL
Qty: 1 | Refills: 3 | Status: ACTIVE | COMMUNITY
Start: 2024-09-27 | End: 1900-01-01

## 2024-09-27 RX ORDER — CLINDAMYCIN PHOSPHATE 10 MG/ML
1 SOLUTION TOPICAL
Qty: 1 | Refills: 2 | Status: ACTIVE | COMMUNITY
Start: 2024-09-27 | End: 1900-01-01

## 2024-09-27 NOTE — HISTORY OF PRESENT ILLNESS
[FreeTextEntry1] : ROSANGELA acne [de-identified] : BRUCE DE JESUS is a 39 year old F who presents for evaluation of the following  1. Acne- on face, back, upper lateral thighs Present for about 5 years since childbirth  Using curology formula (with numerous ingredients- including AA, niacinamide, tretinoin, clindamycin, spironolactone, etc) all in one formulation  Feels like face flares with menses Was previously on antonio 100mg for about a year wtihout much noticeable improvement  Notes she has some anxiety that leads to her picking the lesions- notes improvement since starting bupropion  -5/6/2024: Minimal improvement while on clinda, though patient ran out for a month-since then thinks it has been worse. Also using BPO to face and body. Still picking. Psychiatrist went up on bupropion yesterday -8/16/24: Did well on Doxy for 2 months, but then came back once stopped it. Using BPO/clinda/tret but does not feel like it is controlling the acne. Still picking- was on NAC for a bit but then went away and forgot to re-start it, did not feel like it was helping. Notes some flares with menses, particularly on face -9/27/24: Was going to start Antonio but never got labs. Using NAC consistently bid and feels like it has helped her skin picking on body, but face and neck bumps are getting worse. Hasn't had a chance to discuss Accutane with her psychiatrist yet, seeing her Nov 2  SH: [place]

## 2024-09-27 NOTE — ASSESSMENT
[FreeTextEntry1] : #Acne - Chronic, flaring - C/w BPO wash (5% or less to face, 10% to body daily)- instructed to keep in cool, dark area - Clinda lotion qam, SED - C/w tretinoin 0.025% cream. SED. Discussed proper use, including using a pea-sized amount for entire face, starting every other night and increasing to nightly use as tolerated, and liberal use of oil-free, non-comedogenic moisturizer such as Cetaphil or CeraVe - Disc Accutane vs Antonio. Pt with active depression and anxiety she is being managed for- she will discuss with her psychiatrist whether she would be an appropriate candidate for Accutane. Meanwhile, will start with antonio (has been on 100mg before, uncertain if helped) - Start antonio 100mg nightly. SED. CMP 09/2024 reviewed    #Skin picking - Sig component of picking likely 2/2 some underlying anxiety, c/w bupropion (recent dose increase) as well as continued psych care  - C/w NAC 600mg bid, SED  RTC 3 months

## 2024-09-27 NOTE — HISTORY OF PRESENT ILLNESS
[FreeTextEntry1] : ROSANGELA acne [de-identified] : BRUCE DE JESUS is a 39 year old F who presents for evaluation of the following  1. Acne- on face, back, upper lateral thighs Present for about 5 years since childbirth  Using curology formula (with numerous ingredients- including AA, niacinamide, tretinoin, clindamycin, spironolactone, etc) all in one formulation  Feels like face flares with menses Was previously on antonio 100mg for about a year wtihout much noticeable improvement  Notes she has some anxiety that leads to her picking the lesions- notes improvement since starting bupropion  -5/6/2024: Minimal improvement while on clinda, though patient ran out for a month-since then thinks it has been worse. Also using BPO to face and body. Still picking. Psychiatrist went up on bupropion yesterday -8/16/24: Did well on Doxy for 2 months, but then came back once stopped it. Using BPO/clinda/tret but does not feel like it is controlling the acne. Still picking- was on NAC for a bit but then went away and forgot to re-start it, did not feel like it was helping. Notes some flares with menses, particularly on face -9/27/24: Was going to start Antonio but never got labs. Using NAC consistently bid and feels like it has helped her skin picking on body, but face and neck bumps are getting worse. Hasn't had a chance to discuss Accutane with her psychiatrist yet, seeing her Nov 2  SH: [place]

## 2024-09-27 NOTE — PHYSICAL EXAM
[FreeTextEntry3] : Focused skin exam performed  The relevant portions of the exam were performed today  AAOx3, NAD, well-appearing / pleasant Focused examination within normal limits with the exception of:  Few acneiform papules over forehead, cheeks, chin Few pink excoriated papules over scalp Numerous round skin colored atrophic scars over back, lateral thighs> chest admixed with pink papules with overlying geometric excoriations

## 2024-10-23 ENCOUNTER — APPOINTMENT (OUTPATIENT)
Dept: NEUROLOGY | Facility: CLINIC | Age: 39
End: 2024-10-23
Payer: COMMERCIAL

## 2024-10-23 VITALS
OXYGEN SATURATION: 98 % | DIASTOLIC BLOOD PRESSURE: 75 MMHG | WEIGHT: 215 LBS | SYSTOLIC BLOOD PRESSURE: 112 MMHG | BODY MASS INDEX: 34.55 KG/M2 | TEMPERATURE: 96.8 F | HEIGHT: 66 IN | HEART RATE: 97 BPM

## 2024-10-23 DIAGNOSIS — R51.9 HEADACHE, UNSPECIFIED: ICD-10-CM

## 2024-10-23 PROCEDURE — G2211 COMPLEX E/M VISIT ADD ON: CPT

## 2024-10-23 PROCEDURE — 99214 OFFICE O/P EST MOD 30 MIN: CPT

## 2024-10-24 RX ORDER — UBROGEPANT 100 MG/1
100 TABLET ORAL DAILY
Qty: 10 | Refills: 3 | Status: ACTIVE | COMMUNITY
Start: 2024-10-24 | End: 1900-01-01

## 2024-12-04 ENCOUNTER — RESULT CHARGE (OUTPATIENT)
Age: 39
End: 2024-12-04

## 2024-12-04 ENCOUNTER — APPOINTMENT (OUTPATIENT)
Dept: ENDOCRINOLOGY | Facility: CLINIC | Age: 39
End: 2024-12-04
Payer: COMMERCIAL

## 2024-12-04 VITALS
WEIGHT: 217 LBS | SYSTOLIC BLOOD PRESSURE: 119 MMHG | BODY MASS INDEX: 35.02 KG/M2 | HEART RATE: 86 BPM | DIASTOLIC BLOOD PRESSURE: 79 MMHG

## 2024-12-04 DIAGNOSIS — R73.03 PREDIABETES.: ICD-10-CM

## 2024-12-04 DIAGNOSIS — E78.5 HYPERLIPIDEMIA, UNSPECIFIED: ICD-10-CM

## 2024-12-04 LAB
GLUCOSE BLDC GLUCOMTR-MCNC: 96
HBA1C MFR BLD HPLC: 5.5

## 2024-12-04 PROCEDURE — 99214 OFFICE O/P EST MOD 30 MIN: CPT | Mod: GC,25

## 2024-12-05 LAB
CHOLEST SERPL-MCNC: 194 MG/DL
HDLC SERPL-MCNC: 76 MG/DL
LDLC SERPL CALC-MCNC: 98 MG/DL
NONHDLC SERPL-MCNC: 118 MG/DL
TRIGL SERPL-MCNC: 118 MG/DL

## 2024-12-16 ENCOUNTER — APPOINTMENT (OUTPATIENT)
Dept: DERMATOLOGY | Facility: CLINIC | Age: 39
End: 2024-12-16
Payer: COMMERCIAL

## 2024-12-16 DIAGNOSIS — L90.5 SCAR CONDITIONS AND FIBROSIS OF SKIN: ICD-10-CM

## 2024-12-16 DIAGNOSIS — L70.0 ACNE VULGARIS: ICD-10-CM

## 2024-12-16 DIAGNOSIS — Z79.899 OTHER LONG TERM (CURRENT) DRUG THERAPY: ICD-10-CM

## 2024-12-16 DIAGNOSIS — F42.4 EXCORIATION (SKIN-PICKING) DISORDER: ICD-10-CM

## 2024-12-16 PROCEDURE — 99214 OFFICE O/P EST MOD 30 MIN: CPT

## 2024-12-17 LAB
ANION GAP SERPL CALC-SCNC: 10 MMOL/L
BUN SERPL-MCNC: 19 MG/DL
CALCIUM SERPL-MCNC: 9.6 MG/DL
CHLORIDE SERPL-SCNC: 105 MMOL/L
CO2 SERPL-SCNC: 22 MMOL/L
CREAT SERPL-MCNC: 0.75 MG/DL
EGFR: 103 ML/MIN/1.73M2
GLUCOSE SERPL-MCNC: 86 MG/DL
POTASSIUM SERPL-SCNC: 4.2 MMOL/L
SODIUM SERPL-SCNC: 137 MMOL/L

## 2024-12-30 ENCOUNTER — APPOINTMENT (OUTPATIENT)
Dept: ENDOCRINOLOGY | Facility: CLINIC | Age: 39
End: 2024-12-30
Payer: COMMERCIAL

## 2024-12-30 PROCEDURE — 97803 MED NUTRITION INDIV SUBSEQ: CPT

## 2025-02-12 DIAGNOSIS — E66.812 OBESITY, CLASS 2: ICD-10-CM

## 2025-02-18 ENCOUNTER — NON-APPOINTMENT (OUTPATIENT)
Age: 40
End: 2025-02-18

## 2025-03-07 ENCOUNTER — APPOINTMENT (OUTPATIENT)
Dept: ENDOCRINOLOGY | Facility: CLINIC | Age: 40
End: 2025-03-07
Payer: COMMERCIAL

## 2025-03-07 VITALS
BODY MASS INDEX: 34.86 KG/M2 | SYSTOLIC BLOOD PRESSURE: 110 MMHG | DIASTOLIC BLOOD PRESSURE: 71 MMHG | WEIGHT: 216 LBS | HEART RATE: 89 BPM

## 2025-03-07 DIAGNOSIS — E78.5 HYPERLIPIDEMIA, UNSPECIFIED: ICD-10-CM

## 2025-03-07 DIAGNOSIS — E66.812 OBESITY, CLASS 2: ICD-10-CM

## 2025-03-07 DIAGNOSIS — R73.03 PREDIABETES.: ICD-10-CM

## 2025-03-07 LAB
GLUCOSE BLDC GLUCOMTR-MCNC: 92
HBA1C MFR BLD HPLC: 5.6

## 2025-03-07 PROCEDURE — 83036 HEMOGLOBIN GLYCOSYLATED A1C: CPT | Mod: QW

## 2025-03-07 PROCEDURE — 99214 OFFICE O/P EST MOD 30 MIN: CPT | Mod: GC,25

## 2025-03-07 PROCEDURE — 82962 GLUCOSE BLOOD TEST: CPT

## 2025-03-11 ENCOUNTER — RX RENEWAL (OUTPATIENT)
Age: 40
End: 2025-03-11

## 2025-03-17 ENCOUNTER — NON-APPOINTMENT (OUTPATIENT)
Age: 40
End: 2025-03-17

## 2025-03-17 ENCOUNTER — APPOINTMENT (OUTPATIENT)
Dept: DERMATOLOGY | Facility: CLINIC | Age: 40
End: 2025-03-17
Payer: COMMERCIAL

## 2025-03-17 DIAGNOSIS — F42.4 EXCORIATION (SKIN-PICKING) DISORDER: ICD-10-CM

## 2025-03-17 DIAGNOSIS — L70.0 ACNE VULGARIS: ICD-10-CM

## 2025-03-17 DIAGNOSIS — L90.5 SCAR CONDITIONS AND FIBROSIS OF SKIN: ICD-10-CM

## 2025-03-17 PROCEDURE — 99214 OFFICE O/P EST MOD 30 MIN: CPT

## 2025-04-04 ENCOUNTER — RX RENEWAL (OUTPATIENT)
Age: 40
End: 2025-04-04

## 2025-05-05 ENCOUNTER — RX RENEWAL (OUTPATIENT)
Age: 40
End: 2025-05-05

## 2025-06-03 ENCOUNTER — APPOINTMENT (OUTPATIENT)
Dept: ENDOCRINOLOGY | Facility: CLINIC | Age: 40
End: 2025-06-03

## 2025-06-11 ENCOUNTER — APPOINTMENT (OUTPATIENT)
Dept: ENDOCRINOLOGY | Facility: CLINIC | Age: 40
End: 2025-06-11
Payer: COMMERCIAL

## 2025-06-11 ENCOUNTER — RESULT CHARGE (OUTPATIENT)
Age: 40
End: 2025-06-11

## 2025-06-11 VITALS
SYSTOLIC BLOOD PRESSURE: 134 MMHG | WEIGHT: 211 LBS | HEART RATE: 96 BPM | BODY MASS INDEX: 34.06 KG/M2 | DIASTOLIC BLOOD PRESSURE: 83 MMHG

## 2025-06-11 LAB
GLUCOSE BLDC GLUCOMTR-MCNC: 119
HBA1C MFR BLD HPLC: 5.6

## 2025-06-11 PROCEDURE — 83036 HEMOGLOBIN GLYCOSYLATED A1C: CPT | Mod: QW

## 2025-06-11 PROCEDURE — 82962 GLUCOSE BLOOD TEST: CPT

## 2025-06-11 PROCEDURE — 99214 OFFICE O/P EST MOD 30 MIN: CPT | Mod: GC,25

## 2025-06-13 ENCOUNTER — NON-APPOINTMENT (OUTPATIENT)
Age: 40
End: 2025-06-13

## 2025-06-13 LAB
ALBUMIN SERPL ELPH-MCNC: 4.3 G/DL
ALP BLD-CCNC: 85 U/L
ALT SERPL-CCNC: 21 U/L
ANION GAP SERPL CALC-SCNC: 14 MMOL/L
AST SERPL-CCNC: 16 U/L
BILIRUB SERPL-MCNC: 0.3 MG/DL
BUN SERPL-MCNC: 17 MG/DL
CALCIUM SERPL-MCNC: 9.7 MG/DL
CHLORIDE SERPL-SCNC: 104 MMOL/L
CHOLEST SERPL-MCNC: 201 MG/DL
CO2 SERPL-SCNC: 20 MMOL/L
CREAT SERPL-MCNC: 0.86 MG/DL
EGFRCR SERPLBLD CKD-EPI 2021: 87 ML/MIN/1.73M2
GLUCOSE SERPL-MCNC: 86 MG/DL
HDLC SERPL-MCNC: 75 MG/DL
LDLC SERPL-MCNC: 104 MG/DL
NONHDLC SERPL-MCNC: 126 MG/DL
POTASSIUM SERPL-SCNC: 4.4 MMOL/L
PROT SERPL-MCNC: 7.1 G/DL
SODIUM SERPL-SCNC: 138 MMOL/L
TRIGL SERPL-MCNC: 124 MG/DL

## 2025-06-13 RX ORDER — ATORVASTATIN CALCIUM 20 MG/1
20 TABLET, FILM COATED ORAL
Qty: 90 | Refills: 0 | Status: ACTIVE | COMMUNITY
Start: 2025-06-13 | End: 1900-01-01

## 2025-07-14 ENCOUNTER — RX RENEWAL (OUTPATIENT)
Age: 40
End: 2025-07-14

## 2025-07-17 ENCOUNTER — APPOINTMENT (OUTPATIENT)
Dept: ENDOCRINOLOGY | Facility: CLINIC | Age: 40
End: 2025-07-17

## 2025-07-17 PROCEDURE — 97803 MED NUTRITION INDIV SUBSEQ: CPT

## 2025-07-24 ENCOUNTER — NON-APPOINTMENT (OUTPATIENT)
Age: 40
End: 2025-07-24

## 2025-07-24 ENCOUNTER — TRANSCRIPTION ENCOUNTER (OUTPATIENT)
Age: 40
End: 2025-07-24

## 2025-09-08 ENCOUNTER — TRANSCRIPTION ENCOUNTER (OUTPATIENT)
Age: 40
End: 2025-09-08

## 2025-09-10 ENCOUNTER — RX RENEWAL (OUTPATIENT)
Age: 40
End: 2025-09-10